# Patient Record
Sex: FEMALE | Race: WHITE | NOT HISPANIC OR LATINO | URBAN - METROPOLITAN AREA
[De-identification: names, ages, dates, MRNs, and addresses within clinical notes are randomized per-mention and may not be internally consistent; named-entity substitution may affect disease eponyms.]

---

## 2017-01-03 ENCOUNTER — OUTPATIENT (OUTPATIENT)
Dept: OUTPATIENT SERVICES | Facility: HOSPITAL | Age: 62
LOS: 1 days | End: 2017-01-03
Payer: COMMERCIAL

## 2017-01-03 DIAGNOSIS — M65.341 TRIGGER FINGER, RIGHT RING FINGER: ICD-10-CM

## 2017-01-03 DIAGNOSIS — Z01.818 ENCOUNTER FOR OTHER PREPROCEDURAL EXAMINATION: ICD-10-CM

## 2017-01-03 DIAGNOSIS — Z01.812 ENCOUNTER FOR PREPROCEDURAL LABORATORY EXAMINATION: ICD-10-CM

## 2017-01-03 DIAGNOSIS — Z90.49 ACQUIRED ABSENCE OF OTHER SPECIFIED PARTS OF DIGESTIVE TRACT: Chronic | ICD-10-CM

## 2017-01-03 DIAGNOSIS — Z01.810 ENCOUNTER FOR PREPROCEDURAL CARDIOVASCULAR EXAMINATION: ICD-10-CM

## 2017-01-03 LAB
ANION GAP SERPL CALC-SCNC: 15 MMOL/L — SIGNIFICANT CHANGE UP (ref 5–17)
BUN SERPL-MCNC: 21 MG/DL — SIGNIFICANT CHANGE UP (ref 7–23)
CALCIUM SERPL-MCNC: 9.7 MG/DL — SIGNIFICANT CHANGE UP (ref 8.4–10.5)
CHLORIDE SERPL-SCNC: 99 MMOL/L — SIGNIFICANT CHANGE UP (ref 96–108)
CO2 SERPL-SCNC: 27 MMOL/L — SIGNIFICANT CHANGE UP (ref 22–31)
CREAT SERPL-MCNC: 0.71 MG/DL — SIGNIFICANT CHANGE UP (ref 0.5–1.3)
GLUCOSE SERPL-MCNC: 86 MG/DL — SIGNIFICANT CHANGE UP (ref 70–99)
HCT VFR BLD CALC: 37.1 % — SIGNIFICANT CHANGE UP (ref 34.5–45)
HGB BLD-MCNC: 12.9 G/DL — SIGNIFICANT CHANGE UP (ref 11.5–15.5)
MCHC RBC-ENTMCNC: 33.5 PG — SIGNIFICANT CHANGE UP (ref 27–34)
MCHC RBC-ENTMCNC: 34.8 GM/DL — SIGNIFICANT CHANGE UP (ref 32–36)
MCV RBC AUTO: 96.4 FL — SIGNIFICANT CHANGE UP (ref 80–100)
PLATELET # BLD AUTO: 229 K/UL — SIGNIFICANT CHANGE UP (ref 150–400)
POTASSIUM SERPL-MCNC: 3.7 MMOL/L — SIGNIFICANT CHANGE UP (ref 3.5–5.3)
POTASSIUM SERPL-SCNC: 3.7 MMOL/L — SIGNIFICANT CHANGE UP (ref 3.5–5.3)
RBC # BLD: 3.85 M/UL — SIGNIFICANT CHANGE UP (ref 3.8–5.2)
RBC # FLD: 14.5 % — SIGNIFICANT CHANGE UP (ref 10.3–14.5)
SODIUM SERPL-SCNC: 141 MMOL/L — SIGNIFICANT CHANGE UP (ref 135–145)
WBC # BLD: 6.32 K/UL — SIGNIFICANT CHANGE UP (ref 3.8–10.5)
WBC # FLD AUTO: 6.32 K/UL — SIGNIFICANT CHANGE UP (ref 3.8–10.5)

## 2017-01-03 PROCEDURE — 80048 BASIC METABOLIC PNL TOTAL CA: CPT

## 2017-01-03 PROCEDURE — G0463: CPT

## 2017-01-03 PROCEDURE — 36415 COLL VENOUS BLD VENIPUNCTURE: CPT

## 2017-01-03 PROCEDURE — 85027 COMPLETE CBC AUTOMATED: CPT

## 2017-01-09 ENCOUNTER — APPOINTMENT (OUTPATIENT)
Dept: ORTHOPEDIC SURGERY | Facility: HOSPITAL | Age: 62
End: 2017-01-09

## 2017-01-09 ENCOUNTER — OUTPATIENT (OUTPATIENT)
Dept: OUTPATIENT SERVICES | Facility: HOSPITAL | Age: 62
LOS: 1 days | End: 2017-01-09
Payer: COMMERCIAL

## 2017-01-09 DIAGNOSIS — M65.341 TRIGGER FINGER, RIGHT RING FINGER: ICD-10-CM

## 2017-01-09 DIAGNOSIS — Z90.49 ACQUIRED ABSENCE OF OTHER SPECIFIED PARTS OF DIGESTIVE TRACT: Chronic | ICD-10-CM

## 2017-01-09 PROCEDURE — 26170 REMOVAL OF PALM TENDON EACH: CPT | Mod: RT

## 2017-01-09 PROCEDURE — 88304 TISSUE EXAM BY PATHOLOGIST: CPT | Mod: 26

## 2017-01-09 PROCEDURE — 26170 REMOVAL OF PALM TENDON EACH: CPT

## 2017-01-09 PROCEDURE — 26055 INCISE FINGER TENDON SHEATH: CPT | Mod: F8,XS

## 2017-01-09 PROCEDURE — 88304 TISSUE EXAM BY PATHOLOGIST: CPT

## 2017-01-09 PROCEDURE — 26055 INCISE FINGER TENDON SHEATH: CPT | Mod: F8

## 2017-01-12 LAB — SURGICAL PATHOLOGY STUDY: SIGNIFICANT CHANGE UP

## 2017-01-17 ENCOUNTER — APPOINTMENT (OUTPATIENT)
Dept: ORTHOPEDIC SURGERY | Facility: CLINIC | Age: 62
End: 2017-01-17

## 2017-01-17 DIAGNOSIS — M65.341 TRIGGER FINGER, RIGHT RING FINGER: ICD-10-CM

## 2017-01-17 RX ORDER — ATORVASTATIN CALCIUM 10 MG/1
10 TABLET, FILM COATED ORAL
Qty: 30 | Refills: 0 | Status: ACTIVE | COMMUNITY
Start: 2016-08-15

## 2017-01-23 ENCOUNTER — APPOINTMENT (OUTPATIENT)
Dept: ORTHOPEDIC SURGERY | Facility: CLINIC | Age: 62
End: 2017-01-23

## 2017-04-24 ENCOUNTER — RESULT REVIEW (OUTPATIENT)
Age: 62
End: 2017-04-24

## 2017-06-19 ENCOUNTER — APPOINTMENT (OUTPATIENT)
Dept: ORTHOPEDIC SURGERY | Facility: CLINIC | Age: 62
End: 2017-06-19

## 2017-08-23 ENCOUNTER — OUTPATIENT (OUTPATIENT)
Dept: OUTPATIENT SERVICES | Facility: HOSPITAL | Age: 62
LOS: 1 days | Discharge: HOME | End: 2017-08-23

## 2017-08-23 ENCOUNTER — APPOINTMENT (OUTPATIENT)
Dept: SPEECH THERAPY | Facility: CLINIC | Age: 62
End: 2017-08-23

## 2017-08-23 DIAGNOSIS — Z90.49 ACQUIRED ABSENCE OF OTHER SPECIFIED PARTS OF DIGESTIVE TRACT: Chronic | ICD-10-CM

## 2017-10-03 DIAGNOSIS — R42 DIZZINESS AND GIDDINESS: ICD-10-CM

## 2018-04-10 ENCOUNTER — OUTPATIENT (OUTPATIENT)
Dept: OUTPATIENT SERVICES | Facility: HOSPITAL | Age: 63
LOS: 1 days | Discharge: HOME | End: 2018-04-10

## 2018-04-10 DIAGNOSIS — Z90.49 ACQUIRED ABSENCE OF OTHER SPECIFIED PARTS OF DIGESTIVE TRACT: Chronic | ICD-10-CM

## 2018-04-11 ENCOUNTER — RESULT REVIEW (OUTPATIENT)
Age: 63
End: 2018-04-11

## 2018-04-11 ENCOUNTER — OUTPATIENT (OUTPATIENT)
Dept: OUTPATIENT SERVICES | Facility: HOSPITAL | Age: 63
LOS: 1 days | Discharge: STILL ADMITTED OR ADMINISTRATIVE | End: 2018-04-11
Payer: MEDICARE

## 2018-04-11 ENCOUNTER — INPATIENT (INPATIENT)
Facility: HOSPITAL | Age: 63
LOS: 19 days | Discharge: HOME | End: 2018-05-01
Attending: PSYCHIATRY & NEUROLOGY | Admitting: PSYCHIATRY & NEUROLOGY
Payer: COMMERCIAL

## 2018-04-11 VITALS — HEIGHT: 68 IN | WEIGHT: 173.94 LBS | TEMPERATURE: 98 F | RESPIRATION RATE: 18 BRPM

## 2018-04-11 DIAGNOSIS — E03.9 HYPOTHYROIDISM, UNSPECIFIED: ICD-10-CM

## 2018-04-11 DIAGNOSIS — F33.9 MAJOR DEPRESSIVE DISORDER, RECURRENT, UNSPECIFIED: ICD-10-CM

## 2018-04-11 DIAGNOSIS — F41.9 ANXIETY DISORDER, UNSPECIFIED: ICD-10-CM

## 2018-04-11 DIAGNOSIS — Z90.49 ACQUIRED ABSENCE OF OTHER SPECIFIED PARTS OF DIGESTIVE TRACT: Chronic | ICD-10-CM

## 2018-04-11 DIAGNOSIS — F33.1 MAJOR DEPRESSIVE DISORDER, RECURRENT, MODERATE: ICD-10-CM

## 2018-04-11 DIAGNOSIS — I10 ESSENTIAL (PRIMARY) HYPERTENSION: ICD-10-CM

## 2018-04-11 PROCEDURE — 90870 ELECTROCONVULSIVE THERAPY: CPT

## 2018-04-11 RX ORDER — LEVOTHYROXINE SODIUM 125 MCG
50 TABLET ORAL DAILY
Qty: 0 | Refills: 0 | Status: DISCONTINUED | OUTPATIENT
Start: 2018-04-11 | End: 2018-05-01

## 2018-04-11 RX ORDER — VALSARTAN 80 MG/1
320 TABLET ORAL DAILY
Qty: 0 | Refills: 0 | Status: DISCONTINUED | OUTPATIENT
Start: 2018-04-11 | End: 2018-05-01

## 2018-04-11 RX ORDER — ATORVASTATIN CALCIUM 80 MG/1
20 TABLET, FILM COATED ORAL AT BEDTIME
Qty: 0 | Refills: 0 | Status: DISCONTINUED | OUTPATIENT
Start: 2018-04-11 | End: 2018-05-01

## 2018-04-11 RX ORDER — HYDROCHLOROTHIAZIDE 25 MG
25 TABLET ORAL DAILY
Qty: 0 | Refills: 0 | Status: DISCONTINUED | OUTPATIENT
Start: 2018-04-11 | End: 2018-04-12

## 2018-04-11 RX ORDER — CLONAZEPAM 1 MG
0.5 TABLET ORAL
Qty: 0 | Refills: 0 | Status: DISCONTINUED | OUTPATIENT
Start: 2018-04-11 | End: 2018-04-12

## 2018-04-11 RX ADMIN — ATORVASTATIN CALCIUM 20 MILLIGRAM(S): 80 TABLET, FILM COATED ORAL at 21:05

## 2018-04-11 RX ADMIN — Medication 0.5 MILLIGRAM(S): at 21:05

## 2018-04-11 NOTE — CHART NOTE - NSCHARTNOTEFT_GEN_A_CORE
Screening Medical Evaluation  Patient Admitted from: [facility] admitting diagnosis: Recurrent major depressive disorder    PAST MEDICAL & SURGICAL HISTORY:  Anxiety  UTI (urinary tract infection)  HTN (hypertension)  Anxiety  HLD (hyperlipidemia)  HTN (hypertension)  S/P cholecystectomy: 6 yrs ago        Allergies    penicillin (Unknown)    Intolerances        Social History:     FAMILY HISTORY:  Family history of prostate cancer  Family history of diabetes mellitus  Family history of hypertension  Family history of cancer: spindle cell CA      MEDICATIONS  (STANDING):  atorvastatin 20 milliGRAM(s) Oral at bedtime  clonazePAM Tablet 0.5 milliGRAM(s) Oral two times a day  hydrochlorothiazide 25 milliGRAM(s) Oral daily  levothyroxine 50 MICROGram(s) Oral daily  valsartan 320 milliGRAM(s) Oral daily    MEDICATIONS  (PRN):  LORazepam     Tablet 1 milliGRAM(s) Oral every 6 hours PRN Anxiety  LORazepam   Injectable 2 milliGRAM(s) IntraMuscular every 6 hours PRN Agitation      Vital Signs Last 24 Hrs  T(C): 36.4 (11 Apr 2018 17:55), Max: 36.4 (11 Apr 2018 17:55)  T(F): 97.5 (11 Apr 2018 17:55), Max: 97.5 (11 Apr 2018 17:55)  HR: --  BP: --  BP(mean): --  RR: 18 (11 Apr 2018 17:55) (18 - 18)  SpO2: --  CAPILLARY BLOOD GLUCOSE            PHYSICAL EXAM:  GENERAL: NAD, well-developed  HEAD:  Atraumatic, Normocephalic  EYES: EOMI, PERRLA, conjunctiva and sclera clear  NECK: Supple, No JVD  CHEST/LUNG: Clear to auscultation bilaterally; No wheeze  HEART: Regular rate and rhythm; No murmurs, rubs, or gallops  ABDOMEN: Soft, Nontender, Nondistended; Bowel sounds present  EXTREMITIES:  2+ Peripheral Pulses, No clubbing, cyanosis, or edema  PSYCH: AAOx3  NEUROLOGY: non-focal  SKIN:     LABS:                    RADIOLOGY & ADDITIONAL TESTS:    Assessment and Plan: 62 yo F with PMH of hypertension, hyperlipidemia, and hypothyroidism is admitted to [facility] with a primary psychiatric diagnosis of Recurrent major depressive disorder. The pt currently denies having any medical complaints such as chest pain, sob, abdominal pain, n/v/d/c, or any problems with urination or bowel movements. The rest of her screening physical is unremarkable.    1.Recurrent major depressive disorder-Plan: continue with meds as per primary psychiatric team   2. HTN-Plan: continue with hydrochlorothiazide and valsartan  3. HLD-Plan: continue with atorvastatin  4. Hypothyroidism-Plan: continue with levothyroxine Screening Medical Evaluation  Patient Admitted from: [facility] admitting diagnosis: Recurrent major depressive disorder    PAST MEDICAL & SURGICAL HISTORY:  Anxiety  UTI (urinary tract infection)  HTN (hypertension)  Anxiety  HLD (hyperlipidemia)  HTN (hypertension)  S/P cholecystectomy: 6 yrs ago        Allergies    penicillin (Unknown)    Intolerances        Social History:     FAMILY HISTORY:  Family history of prostate cancer  Family history of diabetes mellitus  Family history of hypertension  Family history of cancer: spindle cell CA      MEDICATIONS  (STANDING):  atorvastatin 20 milliGRAM(s) Oral at bedtime  clonazePAM Tablet 0.5 milliGRAM(s) Oral two times a day  hydrochlorothiazide 25 milliGRAM(s) Oral daily  levothyroxine 50 MICROGram(s) Oral daily  valsartan 320 milliGRAM(s) Oral daily    MEDICATIONS  (PRN):  LORazepam     Tablet 1 milliGRAM(s) Oral every 6 hours PRN Anxiety  LORazepam   Injectable 2 milliGRAM(s) IntraMuscular every 6 hours PRN Agitation      Vital Signs Last 24 Hrs  T(C): 36.4 (11 Apr 2018 17:55), Max: 36.4 (11 Apr 2018 17:55)  T(F): 97.5 (11 Apr 2018 17:55), Max: 97.5 (11 Apr 2018 17:55)  HR: --88  BP: --136/82  BP(mean): --  RR: 18 (11 Apr 2018 17:55) (18 - 18)  SpO2: --  CAPILLARY BLOOD GLUCOSE            PHYSICAL EXAM:  GENERAL: NAD, well-developed  HEAD:  Atraumatic, Normocephalic  EYES: EOMI, PERRLA, conjunctiva and sclera clear  NECK: Supple, No JVD  CHEST/LUNG: Clear to auscultation bilaterally; No wheeze  HEART: Regular rate and rhythm; No murmurs, rubs, or gallops  ABDOMEN: Soft, Nontender, Nondistended; Bowel sounds present  EXTREMITIES:  2+ Peripheral Pulses, No clubbing, cyanosis, or edema  PSYCH: AAOx3  NEUROLOGY: non-focal  SKIN: In tact    LABS:                    RADIOLOGY & ADDITIONAL TESTS:    Assessment and Plan: 64 yo F with PMH of hypertension, hyperlipidemia, and hypothyroidism is admitted to [facility] with a primary psychiatric diagnosis of Recurrent major depressive disorder. The pt currently denies having any medical complaints such as chest pain, sob, abdominal pain, n/v/d/c, or any problems with urination or bowel movements. The rest of her screening physical is unremarkable.    1.Recurrent major depressive disorder-Plan: continue with meds as per primary psychiatric team   2. HTN-Plan: continue with hydrochlorothiazide and valsartan  3. HLD-Plan: continue with atorvastatin  4. Hypothyroidism-Plan: continue with levothyroxine

## 2018-04-12 DIAGNOSIS — F32.3 MAJOR DEPRESSIVE DISORDER, SINGLE EPISODE, SEVERE WITH PSYCHOTIC FEATURES: ICD-10-CM

## 2018-04-12 LAB
ALBUMIN SERPL ELPH-MCNC: 4 G/DL — SIGNIFICANT CHANGE UP (ref 3.3–5)
ALBUMIN SERPL ELPH-MCNC: 4 G/DL — SIGNIFICANT CHANGE UP (ref 3.3–5)
ALP SERPL-CCNC: 33 U/L — LOW (ref 40–120)
ALP SERPL-CCNC: 33 U/L — LOW (ref 40–120)
ALT FLD-CCNC: 32 U/L — SIGNIFICANT CHANGE UP (ref 4–33)
ALT FLD-CCNC: 34 U/L — HIGH (ref 4–33)
AMPHET UR-MCNC: NEGATIVE — SIGNIFICANT CHANGE UP
APPEARANCE UR: CLEAR — SIGNIFICANT CHANGE UP
AST SERPL-CCNC: 36 U/L — HIGH (ref 4–32)
AST SERPL-CCNC: 36 U/L — HIGH (ref 4–32)
BACTERIA # UR AUTO: SIGNIFICANT CHANGE UP
BARBITURATES UR SCN-MCNC: NEGATIVE — SIGNIFICANT CHANGE UP
BASOPHILS # BLD AUTO: 0.02 K/UL — SIGNIFICANT CHANGE UP (ref 0–0.2)
BASOPHILS NFR BLD AUTO: 0.3 % — SIGNIFICANT CHANGE UP (ref 0–2)
BENZODIAZ UR-MCNC: NEGATIVE — SIGNIFICANT CHANGE UP
BILIRUB SERPL-MCNC: 0.6 MG/DL — SIGNIFICANT CHANGE UP (ref 0.2–1.2)
BILIRUB SERPL-MCNC: 0.7 MG/DL — SIGNIFICANT CHANGE UP (ref 0.2–1.2)
BILIRUB UR-MCNC: NEGATIVE — SIGNIFICANT CHANGE UP
BLOOD UR QL VISUAL: HIGH
BUN SERPL-MCNC: 10 MG/DL — SIGNIFICANT CHANGE UP (ref 7–23)
BUN SERPL-MCNC: 11 MG/DL — SIGNIFICANT CHANGE UP (ref 7–23)
CALCIUM SERPL-MCNC: 9.3 MG/DL — SIGNIFICANT CHANGE UP (ref 8.4–10.5)
CALCIUM SERPL-MCNC: 9.3 MG/DL — SIGNIFICANT CHANGE UP (ref 8.4–10.5)
CANNABINOIDS UR-MCNC: NEGATIVE — SIGNIFICANT CHANGE UP
CHLORIDE SERPL-SCNC: 82 MMOL/L — LOW (ref 98–107)
CHLORIDE SERPL-SCNC: 83 MMOL/L — LOW (ref 98–107)
CHOLEST SERPL-MCNC: 137 MG/DL — SIGNIFICANT CHANGE UP (ref 120–199)
CO2 SERPL-SCNC: 30 MMOL/L — SIGNIFICANT CHANGE UP (ref 22–31)
CO2 SERPL-SCNC: 31 MMOL/L — SIGNIFICANT CHANGE UP (ref 22–31)
COCAINE METAB.OTHER UR-MCNC: NEGATIVE — SIGNIFICANT CHANGE UP
COLOR SPEC: SIGNIFICANT CHANGE UP
CREAT SERPL-MCNC: 0.62 MG/DL — SIGNIFICANT CHANGE UP (ref 0.5–1.3)
CREAT SERPL-MCNC: 0.63 MG/DL — SIGNIFICANT CHANGE UP (ref 0.5–1.3)
EOSINOPHIL # BLD AUTO: 0.1 K/UL — SIGNIFICANT CHANGE UP (ref 0–0.5)
EOSINOPHIL NFR BLD AUTO: 1.4 % — SIGNIFICANT CHANGE UP (ref 0–6)
GLUCOSE SERPL-MCNC: 110 MG/DL — HIGH (ref 70–99)
GLUCOSE SERPL-MCNC: 95 MG/DL — SIGNIFICANT CHANGE UP (ref 70–99)
GLUCOSE UR-MCNC: NEGATIVE — SIGNIFICANT CHANGE UP
HBA1C BLD-MCNC: 5.4 % — SIGNIFICANT CHANGE UP (ref 4–5.6)
HCT VFR BLD CALC: 36.7 % — SIGNIFICANT CHANGE UP (ref 34.5–45)
HDLC SERPL-MCNC: 72 MG/DL — HIGH (ref 45–65)
HGB BLD-MCNC: 12.9 G/DL — SIGNIFICANT CHANGE UP (ref 11.5–15.5)
IMM GRANULOCYTES # BLD AUTO: 0.02 # — SIGNIFICANT CHANGE UP
IMM GRANULOCYTES NFR BLD AUTO: 0.3 % — SIGNIFICANT CHANGE UP (ref 0–1.5)
KETONES UR-MCNC: NEGATIVE — SIGNIFICANT CHANGE UP
LEUKOCYTE ESTERASE UR-ACNC: HIGH
LIPID PNL WITH DIRECT LDL SERPL: 60 MG/DL — SIGNIFICANT CHANGE UP
LYMPHOCYTES # BLD AUTO: 0.93 K/UL — LOW (ref 1–3.3)
LYMPHOCYTES # BLD AUTO: 12.7 % — LOW (ref 13–44)
MCHC RBC-ENTMCNC: 33.9 PG — SIGNIFICANT CHANGE UP (ref 27–34)
MCHC RBC-ENTMCNC: 35.1 % — SIGNIFICANT CHANGE UP (ref 32–36)
MCV RBC AUTO: 96.6 FL — SIGNIFICANT CHANGE UP (ref 80–100)
METHADONE UR-MCNC: NEGATIVE — SIGNIFICANT CHANGE UP
MONOCYTES # BLD AUTO: 0.86 K/UL — SIGNIFICANT CHANGE UP (ref 0–0.9)
MONOCYTES NFR BLD AUTO: 11.8 % — SIGNIFICANT CHANGE UP (ref 2–14)
MUCOUS THREADS # UR AUTO: SIGNIFICANT CHANGE UP
NEUTROPHILS # BLD AUTO: 5.37 K/UL — SIGNIFICANT CHANGE UP (ref 1.8–7.4)
NEUTROPHILS NFR BLD AUTO: 73.5 % — SIGNIFICANT CHANGE UP (ref 43–77)
NITRITE UR-MCNC: NEGATIVE — SIGNIFICANT CHANGE UP
NRBC # FLD: 0 — SIGNIFICANT CHANGE UP
OPIATES UR-MCNC: NEGATIVE — SIGNIFICANT CHANGE UP
OSMOLALITY SERPL: 250 MOSMO/KG — LOW (ref 275–295)
OXYCODONE UR-MCNC: NEGATIVE — SIGNIFICANT CHANGE UP
PCP UR-MCNC: NEGATIVE — SIGNIFICANT CHANGE UP
PH UR: 7.5 — SIGNIFICANT CHANGE UP (ref 4.6–8)
PLATELET # BLD AUTO: 241 K/UL — SIGNIFICANT CHANGE UP (ref 150–400)
PMV BLD: 11 FL — SIGNIFICANT CHANGE UP (ref 7–13)
POTASSIUM SERPL-MCNC: 3.2 MMOL/L — LOW (ref 3.5–5.3)
POTASSIUM SERPL-MCNC: 3.9 MMOL/L — SIGNIFICANT CHANGE UP (ref 3.5–5.3)
POTASSIUM SERPL-SCNC: 3.2 MMOL/L — LOW (ref 3.5–5.3)
POTASSIUM SERPL-SCNC: 3.9 MMOL/L — SIGNIFICANT CHANGE UP (ref 3.5–5.3)
PROT SERPL-MCNC: 7.1 G/DL — SIGNIFICANT CHANGE UP (ref 6–8.3)
PROT SERPL-MCNC: 7.2 G/DL — SIGNIFICANT CHANGE UP (ref 6–8.3)
PROT UR-MCNC: NEGATIVE MG/DL — SIGNIFICANT CHANGE UP
RBC # BLD: 3.8 M/UL — SIGNIFICANT CHANGE UP (ref 3.8–5.2)
RBC # FLD: 14.2 % — SIGNIFICANT CHANGE UP (ref 10.3–14.5)
RBC CASTS # UR COMP ASSIST: SIGNIFICANT CHANGE UP (ref 0–?)
SODIUM SERPL-SCNC: 123 MMOL/L — LOW (ref 135–145)
SODIUM SERPL-SCNC: 126 MMOL/L — LOW (ref 135–145)
SP GR SPEC: 1.01 — SIGNIFICANT CHANGE UP (ref 1–1.04)
SQUAMOUS # UR AUTO: SIGNIFICANT CHANGE UP
TRIGL SERPL-MCNC: 62 MG/DL — SIGNIFICANT CHANGE UP (ref 10–149)
TSH SERPL-MCNC: 0.89 UIU/ML — SIGNIFICANT CHANGE UP (ref 0.27–4.2)
UROBILINOGEN FLD QL: NORMAL MG/DL — SIGNIFICANT CHANGE UP
WBC # BLD: 7.3 K/UL — SIGNIFICANT CHANGE UP (ref 3.8–10.5)
WBC # FLD AUTO: 7.3 K/UL — SIGNIFICANT CHANGE UP (ref 3.8–10.5)
WBC UR QL: SIGNIFICANT CHANGE UP (ref 0–?)

## 2018-04-12 PROCEDURE — 90853 GROUP PSYCHOTHERAPY: CPT

## 2018-04-12 PROCEDURE — 99223 1ST HOSP IP/OBS HIGH 75: CPT

## 2018-04-12 PROCEDURE — 99223 1ST HOSP IP/OBS HIGH 75: CPT | Mod: 25

## 2018-04-12 PROCEDURE — 93010 ELECTROCARDIOGRAM REPORT: CPT

## 2018-04-12 RX ORDER — SODIUM CHLORIDE 9 MG/ML
1 INJECTION INTRAMUSCULAR; INTRAVENOUS; SUBCUTANEOUS
Qty: 0 | Refills: 0 | Status: DISCONTINUED | OUTPATIENT
Start: 2018-04-12 | End: 2018-04-13

## 2018-04-12 RX ORDER — LANOLIN ALCOHOL/MO/W.PET/CERES
3 CREAM (GRAM) TOPICAL AT BEDTIME
Qty: 0 | Refills: 0 | Status: DISCONTINUED | OUTPATIENT
Start: 2018-04-12 | End: 2018-04-17

## 2018-04-12 RX ADMIN — Medication 3 MILLIGRAM(S): at 21:06

## 2018-04-12 RX ADMIN — VALSARTAN 320 MILLIGRAM(S): 80 TABLET ORAL at 08:45

## 2018-04-12 RX ADMIN — Medication 0.5 MILLIGRAM(S): at 08:45

## 2018-04-12 RX ADMIN — SODIUM CHLORIDE 1 GRAM(S): 9 INJECTION INTRAMUSCULAR; INTRAVENOUS; SUBCUTANEOUS at 21:07

## 2018-04-12 RX ADMIN — SODIUM CHLORIDE 1 GRAM(S): 9 INJECTION INTRAMUSCULAR; INTRAVENOUS; SUBCUTANEOUS at 13:44

## 2018-04-12 RX ADMIN — ATORVASTATIN CALCIUM 20 MILLIGRAM(S): 80 TABLET, FILM COATED ORAL at 21:06

## 2018-04-12 RX ADMIN — Medication 25 MILLIGRAM(S): at 08:45

## 2018-04-12 RX ADMIN — Medication 50 MICROGRAM(S): at 08:45

## 2018-04-12 NOTE — CONSULT NOTE ADULT - SUBJECTIVE AND OBJECTIVE BOX
HPI:          PAST MEDICAL & SURGICAL HISTORY:  Anxiety  UTI (urinary tract infection)  HTN (hypertension)  Anxiety  HLD (hyperlipidemia)  HTN (hypertension)  S/P cholecystectomy: 6 yrs ago      Review of Systems:   CONSTITUTIONAL: No fever, weight loss, or fatigue  EYES: No eye pain, visual disturbances, or discharge  ENMT:  No difficulty hearing, tinnitus, vertigo; No sinus or throat pain  NECK: No pain or stiffness  RESPIRATORY: No cough, wheezing, chills or hemoptysis; No shortness of breath  CARDIOVASCULAR: No chest pain, palpitations, dizziness, or leg swelling  GASTROINTESTINAL: No abdominal or epigastric pain. No nausea, vomiting, or hematemesis; No diarrhea or constipation. No melena or hematochezia.  GENITOURINARY: No dysuria, frequency, hematuria, or incontinence  NEUROLOGICAL: No headaches, memory loss, loss of strength, numbness, or tremors  SKIN: No itching, burning, rashes, or lesions   ENDOCRINE: No heat or cold intolerance; No hair loss  MUSCULOSKELETAL: No joint pain or swelling; No muscle, back, or extremity pain      Allergies    penicillin (Unknown)    Intolerances        Social History:   lives w/   denies substance abuse     FAMILY HISTORY:  Family history of prostate cancer  Family history of diabetes mellitus  Family history of hypertension  Family history of cancer: spindle cell CA      MEDICATIONS  (STANDING):  atorvastatin 20 milliGRAM(s) Oral at bedtime  clonazePAM Tablet 0.5 milliGRAM(s) Oral two times a day  levothyroxine 50 MICROGram(s) Oral daily  sodium chloride 1 Gram(s) Oral two times a day  valsartan 320 milliGRAM(s) Oral daily    MEDICATIONS  (PRN):  LORazepam     Tablet 1 milliGRAM(s) Oral every 6 hours PRN Anxiety  LORazepam   Injectable 2 milliGRAM(s) IntraMuscular every 6 hours PRN Agitation        CAPILLARY BLOOD GLUCOSE      VS:  T 98.0 /83 HR 79 RR 20       PHYSICAL EXAM:  GENERAL: NAD, well-developed  HEAD:  Atraumatic, Normocephalic  EYES: EOMI, PERRLA, conjunctiva and sclera clear  NECK: Supple, No JVD  CHEST/LUNG: Clear to auscultation bilaterally; No wheeze  HEART: Regular rate and rhythm; No murmurs, rubs, or gallops  ABDOMEN: Soft, Nontender, Nondistended; Bowel sounds present  EXTREMITIES:  2+ Peripheral Pulses, No clubbing, cyanosis, or edema  NEUROLOGY: non-focal  SKIN: No rashes or lesions    LABS:                        12.9   7.30  )-----------( 241      ( 2018 08:11 )             36.7     04-12    123<L>  |  82<L>  |  11  ----------------------------<  95  3.9   |  31  |  0.62    Ca    9.3      2018 12:36    TPro  7.1  /  Alb  4.0  /  TBili  0.6  /  DBili  x   /  AST  36<H>  /  ALT  32  /  AlkPhos  33<L>  04-12          Urinalysis Basic - ( 2018 08:03 )    Color: COLORL / Appearance: CLEAR / S.006 / pH: 7.5  Gluc: NEGATIVE / Ketone: NEGATIVE  / Bili: NEGATIVE / Urobili: NORMAL mg/dL   Blood: SMALL / Protein: NEGATIVE mg/dL / Nitrite: NEGATIVE   Leuk Esterase: LARGE / RBC: 2-5 / WBC 10-25   Sq Epi: OCC / Non Sq Epi: x / Bacteria: FEW        RADIOLOGY & ADDITIONAL TESTS:    Imaging Personally Reviewed:    Consultant(s) Notes Reviewed:      Care Discussed with Consultants/Other Providers: Dr. Khalil HPI:    62 yo F w/ MDD, hypothyroidism, HTN, HLD admitted for psychiatric stabilization, with plan for ECT. Pt states she last had ECT in  with good effect.   She denies any previous history of hyponatremia, and states that she saw her PMD Dr. Butcher about a week ago and had routine bloodwork done, "cannot remember" if she was told there was any abnormality.     Pt denies all medical complaints at this time, is eager to start ECT again.         PAST MEDICAL & SURGICAL HISTORY:  Anxiety  UTI (urinary tract infection)  HTN (hypertension)  Anxiety  HLD (hyperlipidemia)  HTN (hypertension)  S/P cholecystectomy: 6 yrs ago      Review of Systems:   CONSTITUTIONAL: No fever, weight loss, or fatigue  EYES: No eye pain, visual disturbances, or discharge  ENMT:  No difficulty hearing, tinnitus, vertigo; No sinus or throat pain  NECK: No pain or stiffness  RESPIRATORY: No cough, wheezing, chills or hemoptysis; No shortness of breath  CARDIOVASCULAR: No chest pain, palpitations, dizziness, or leg swelling  GASTROINTESTINAL: No abdominal or epigastric pain. No nausea, vomiting, or hematemesis; No diarrhea or constipation. No melena or hematochezia.  GENITOURINARY: No dysuria, frequency, hematuria, or incontinence  NEUROLOGICAL: No headaches, memory loss, loss of strength, numbness, or tremors  SKIN: No itching, burning, rashes, or lesions   ENDOCRINE: No heat or cold intolerance; No hair loss  MUSCULOSKELETAL: No joint pain or swelling; No muscle, back, or extremity pain      Allergies    penicillin (Unknown)    Intolerances        Social History:   lives w/   denies substance abuse     FAMILY HISTORY:  Family history of prostate cancer  Family history of diabetes mellitus  Family history of hypertension  Family history of cancer: spindle cell CA      MEDICATIONS  (STANDING):  atorvastatin 20 milliGRAM(s) Oral at bedtime  clonazePAM Tablet 0.5 milliGRAM(s) Oral two times a day  levothyroxine 50 MICROGram(s) Oral daily  sodium chloride 1 Gram(s) Oral two times a day  valsartan 320 milliGRAM(s) Oral daily    MEDICATIONS  (PRN):  LORazepam     Tablet 1 milliGRAM(s) Oral every 6 hours PRN Anxiety  LORazepam   Injectable 2 milliGRAM(s) IntraMuscular every 6 hours PRN Agitation        CAPILLARY BLOOD GLUCOSE      VS:  T 98.0 /83 HR 79 RR 20       PHYSICAL EXAM:  GENERAL: NAD, well-developed  HEAD:  Atraumatic, Normocephalic  EYES: EOMI, PERRLA, conjunctiva and sclera clear  NECK: Supple, No JVD  CHEST/LUNG: Clear to auscultation bilaterally; No wheeze  HEART: Regular rate and rhythm; No murmurs, rubs, or gallops  ABDOMEN: Soft, Nontender, Nondistended; Bowel sounds present  EXTREMITIES:  2+ Peripheral Pulses, No clubbing, cyanosis, or edema  NEUROLOGY: non-focal  SKIN: No rashes or lesions    LABS:                        12.9   7.30  )-----------( 241      ( 2018 08:11 )             36.7     04-12    123<L>  |  82<L>  |  11  ----------------------------<  95  3.9   |  31  |  0.62    Ca    9.3      2018 12:36    TPro  7.1  /  Alb  4.0  /  TBili  0.6  /  DBili  x   /  AST  36<H>  /  ALT  32  /  AlkPhos  33<L>  04-12          Urinalysis Basic - ( 2018 08:03 )    Color: COLORL / Appearance: CLEAR / S.006 / pH: 7.5  Gluc: NEGATIVE / Ketone: NEGATIVE  / Bili: NEGATIVE / Urobili: NORMAL mg/dL   Blood: SMALL / Protein: NEGATIVE mg/dL / Nitrite: NEGATIVE   Leuk Esterase: LARGE / RBC: 2-5 / WBC 10-25   Sq Epi: OCC / Non Sq Epi: x / Bacteria: FEW        RADIOLOGY & ADDITIONAL TESTS:    Imaging Personally Reviewed:    Consultant(s) Notes Reviewed:      Care Discussed with Consultants/Other Providers: Dr. Khalil

## 2018-04-12 NOTE — CONSULT NOTE ADULT - ASSESSMENT
62 yo F w/ MDD, hypothyroidism, HTN, HLD admitted for psychiatric stabilization, found to have hyponatremia 64 yo F w/ MDD, hypothyroidism, HTN, HLD admitted for psychiatric stabilization, found to have hyponatremia     # Hyponatremia - pt euvolemic on exam, would d/c thiazide (HCTZ), encourage PO hydration, repeat BMP in AM. D/w Dr. Khalil - unless pt's Na>130, pt is not medically optimized for ECT. If sodium does not improve, may need transfer to [facility] ED for further w/u. Will check serum osmolarity, urine osmolarity, urine sodium. Avoid any psychotropics that may exacerbate hyponatremia    # Hypothyroidism - c/w Synthroid, TSH wnl    #HTN - c/w valsartan, holding HCTZ

## 2018-04-13 LAB
ALBUMIN SERPL ELPH-MCNC: 4.2 G/DL — SIGNIFICANT CHANGE UP (ref 3.3–5)
ALP SERPL-CCNC: 31 U/L — LOW (ref 40–120)
ALT FLD-CCNC: 31 U/L — SIGNIFICANT CHANGE UP (ref 4–33)
AST SERPL-CCNC: 30 U/L — SIGNIFICANT CHANGE UP (ref 4–32)
BILIRUB SERPL-MCNC: 0.5 MG/DL — SIGNIFICANT CHANGE UP (ref 0.2–1.2)
BUN SERPL-MCNC: 8 MG/DL — SIGNIFICANT CHANGE UP (ref 7–23)
CALCIUM SERPL-MCNC: 9 MG/DL — SIGNIFICANT CHANGE UP (ref 8.4–10.5)
CHLORIDE SERPL-SCNC: 90 MMOL/L — LOW (ref 98–107)
CO2 SERPL-SCNC: 30 MMOL/L — SIGNIFICANT CHANGE UP (ref 22–31)
CREAT SERPL-MCNC: 0.64 MG/DL — SIGNIFICANT CHANGE UP (ref 0.5–1.3)
GLUCOSE SERPL-MCNC: 89 MG/DL — SIGNIFICANT CHANGE UP (ref 70–99)
OSMOLALITY SERPL: 270 MOSMO/KG — LOW (ref 275–295)
POTASSIUM SERPL-MCNC: 3.8 MMOL/L — SIGNIFICANT CHANGE UP (ref 3.5–5.3)
POTASSIUM SERPL-SCNC: 3.8 MMOL/L — SIGNIFICANT CHANGE UP (ref 3.5–5.3)
PROT SERPL-MCNC: 7 G/DL — SIGNIFICANT CHANGE UP (ref 6–8.3)
SODIUM SERPL-SCNC: 133 MMOL/L — LOW (ref 135–145)

## 2018-04-13 PROCEDURE — 99232 SBSQ HOSP IP/OBS MODERATE 35: CPT | Mod: 25

## 2018-04-13 PROCEDURE — 90870 ELECTROCONVULSIVE THERAPY: CPT

## 2018-04-13 RX ORDER — IBUPROFEN 200 MG
600 TABLET ORAL ONCE
Qty: 0 | Refills: 0 | Status: COMPLETED | OUTPATIENT
Start: 2018-04-13 | End: 2018-04-13

## 2018-04-13 RX ADMIN — Medication 1 MILLIGRAM(S): at 23:00

## 2018-04-13 RX ADMIN — SODIUM CHLORIDE 1 GRAM(S): 9 INJECTION INTRAMUSCULAR; INTRAVENOUS; SUBCUTANEOUS at 09:45

## 2018-04-13 RX ADMIN — Medication 3 MILLIGRAM(S): at 22:13

## 2018-04-13 RX ADMIN — VALSARTAN 320 MILLIGRAM(S): 80 TABLET ORAL at 09:45

## 2018-04-13 RX ADMIN — Medication 600 MILLIGRAM(S): at 16:29

## 2018-04-13 RX ADMIN — ATORVASTATIN CALCIUM 20 MILLIGRAM(S): 80 TABLET, FILM COATED ORAL at 22:13

## 2018-04-13 RX ADMIN — Medication 50 MICROGRAM(S): at 09:45

## 2018-04-13 NOTE — CHART NOTE - NSCHARTNOTEFT_GEN_A_CORE
Pt Na 133 this morning.  Pt is medically optimized for ECT at this time.  Will recheck BMP on Monday.  D/w Dr. Khalil      Depression with need for ECT, pre-procedure evaluation:   ECT specific risk assessment: pt without history of increased ICP, aneurysm, active pulmonary disease, valvular disease, CAD, cerebral vascular disease.     Cardiovascular risk assessment: Patient evaluated using the revised cardiac risk index and is felt to be low cardiovascular risk for a low cardiovascular risk procedure based on these criteria.   Risk for complication is low. Patient is optimized for ECT treatment without further intervention and can proceed with treatment.    Anesthesia specific concerns : None    History of adverse reaction to anesthesia previously: No   Esophageal Assessment: wnl   Known Spine issues: no   CKD: No

## 2018-04-14 PROCEDURE — 99232 SBSQ HOSP IP/OBS MODERATE 35: CPT

## 2018-04-14 RX ORDER — DOCUSATE SODIUM 100 MG
100 CAPSULE ORAL DAILY
Qty: 0 | Refills: 0 | Status: DISCONTINUED | OUTPATIENT
Start: 2018-04-14 | End: 2018-04-19

## 2018-04-14 RX ORDER — SENNA PLUS 8.6 MG/1
1 TABLET ORAL DAILY
Qty: 0 | Refills: 0 | Status: DISCONTINUED | OUTPATIENT
Start: 2018-04-14 | End: 2018-04-19

## 2018-04-14 RX ADMIN — Medication 3 MILLIGRAM(S): at 21:10

## 2018-04-14 RX ADMIN — SENNA PLUS 1 TABLET(S): 8.6 TABLET ORAL at 21:50

## 2018-04-14 RX ADMIN — Medication 50 MICROGRAM(S): at 10:36

## 2018-04-14 RX ADMIN — Medication 100 MILLIGRAM(S): at 14:32

## 2018-04-14 RX ADMIN — Medication 1 MILLIGRAM(S): at 10:57

## 2018-04-14 RX ADMIN — Medication 1 MILLIGRAM(S): at 21:50

## 2018-04-14 RX ADMIN — VALSARTAN 320 MILLIGRAM(S): 80 TABLET ORAL at 10:36

## 2018-04-14 RX ADMIN — ATORVASTATIN CALCIUM 20 MILLIGRAM(S): 80 TABLET, FILM COATED ORAL at 21:10

## 2018-04-15 LAB
APPEARANCE UR: CLEAR — SIGNIFICANT CHANGE UP
BILIRUB UR-MCNC: NEGATIVE — SIGNIFICANT CHANGE UP
BLOOD UR QL VISUAL: HIGH
COLOR SPEC: SIGNIFICANT CHANGE UP
GLUCOSE UR-MCNC: NEGATIVE — SIGNIFICANT CHANGE UP
HCG UR-SCNC: NEGATIVE — SIGNIFICANT CHANGE UP
KETONES UR-MCNC: NEGATIVE — SIGNIFICANT CHANGE UP
LEUKOCYTE ESTERASE UR-ACNC: NEGATIVE — SIGNIFICANT CHANGE UP
NITRITE UR-MCNC: NEGATIVE — SIGNIFICANT CHANGE UP
PH UR: 7 — SIGNIFICANT CHANGE UP (ref 4.6–8)
PROT UR-MCNC: 30 MG/DL — HIGH
RBC CASTS # UR COMP ASSIST: SIGNIFICANT CHANGE UP (ref 0–?)
SP GR SPEC: 1 — LOW (ref 1–1.04)
SP GR UR: 1 — SIGNIFICANT CHANGE UP (ref 1–1.03)
UROBILINOGEN FLD QL: NORMAL MG/DL — SIGNIFICANT CHANGE UP
WBC UR QL: SIGNIFICANT CHANGE UP (ref 0–?)

## 2018-04-15 PROCEDURE — 99232 SBSQ HOSP IP/OBS MODERATE 35: CPT

## 2018-04-15 RX ADMIN — VALSARTAN 320 MILLIGRAM(S): 80 TABLET ORAL at 08:41

## 2018-04-15 RX ADMIN — ATORVASTATIN CALCIUM 20 MILLIGRAM(S): 80 TABLET, FILM COATED ORAL at 21:53

## 2018-04-15 RX ADMIN — Medication 50 MICROGRAM(S): at 08:41

## 2018-04-15 RX ADMIN — Medication 3 MILLIGRAM(S): at 21:53

## 2018-04-15 RX ADMIN — Medication 1 MILLIGRAM(S): at 21:53

## 2018-04-16 LAB
ALBUMIN SERPL ELPH-MCNC: 4.3 G/DL — SIGNIFICANT CHANGE UP (ref 3.3–5)
ALP SERPL-CCNC: 31 U/L — LOW (ref 40–120)
ALT FLD-CCNC: 36 U/L — HIGH (ref 4–33)
AST SERPL-CCNC: 32 U/L — SIGNIFICANT CHANGE UP (ref 4–32)
BILIRUB SERPL-MCNC: 0.6 MG/DL — SIGNIFICANT CHANGE UP (ref 0.2–1.2)
BUN SERPL-MCNC: 7 MG/DL — SIGNIFICANT CHANGE UP (ref 7–23)
BUN SERPL-MCNC: 7 MG/DL — SIGNIFICANT CHANGE UP (ref 7–23)
CALCIUM SERPL-MCNC: 9.2 MG/DL — SIGNIFICANT CHANGE UP (ref 8.4–10.5)
CALCIUM SERPL-MCNC: 9.2 MG/DL — SIGNIFICANT CHANGE UP (ref 8.4–10.5)
CHLORIDE SERPL-SCNC: 92 MMOL/L — LOW (ref 98–107)
CHLORIDE SERPL-SCNC: 92 MMOL/L — LOW (ref 98–107)
CO2 SERPL-SCNC: 30 MMOL/L — SIGNIFICANT CHANGE UP (ref 22–31)
CO2 SERPL-SCNC: 30 MMOL/L — SIGNIFICANT CHANGE UP (ref 22–31)
CREAT SERPL-MCNC: 0.61 MG/DL — SIGNIFICANT CHANGE UP (ref 0.5–1.3)
CREAT SERPL-MCNC: 0.61 MG/DL — SIGNIFICANT CHANGE UP (ref 0.5–1.3)
GLUCOSE SERPL-MCNC: 112 MG/DL — HIGH (ref 70–99)
GLUCOSE SERPL-MCNC: 112 MG/DL — HIGH (ref 70–99)
POTASSIUM SERPL-MCNC: 3.7 MMOL/L — SIGNIFICANT CHANGE UP (ref 3.5–5.3)
POTASSIUM SERPL-MCNC: 3.7 MMOL/L — SIGNIFICANT CHANGE UP (ref 3.5–5.3)
POTASSIUM SERPL-SCNC: 3.7 MMOL/L — SIGNIFICANT CHANGE UP (ref 3.5–5.3)
POTASSIUM SERPL-SCNC: 3.7 MMOL/L — SIGNIFICANT CHANGE UP (ref 3.5–5.3)
PROT SERPL-MCNC: 7.5 G/DL — SIGNIFICANT CHANGE UP (ref 6–8.3)
SODIUM SERPL-SCNC: 134 MMOL/L — LOW (ref 135–145)
SODIUM SERPL-SCNC: 134 MMOL/L — LOW (ref 135–145)

## 2018-04-16 PROCEDURE — 99232 SBSQ HOSP IP/OBS MODERATE 35: CPT | Mod: 25

## 2018-04-16 PROCEDURE — 90870 ELECTROCONVULSIVE THERAPY: CPT

## 2018-04-16 RX ORDER — IBUPROFEN 200 MG
600 TABLET ORAL EVERY 6 HOURS
Qty: 0 | Refills: 0 | Status: DISCONTINUED | OUTPATIENT
Start: 2018-04-16 | End: 2018-04-16

## 2018-04-16 RX ORDER — IBUPROFEN 200 MG
600 TABLET ORAL EVERY 6 HOURS
Qty: 0 | Refills: 0 | Status: DISCONTINUED | OUTPATIENT
Start: 2018-04-16 | End: 2018-05-01

## 2018-04-16 RX ADMIN — Medication 50 MICROGRAM(S): at 08:46

## 2018-04-16 RX ADMIN — ATORVASTATIN CALCIUM 20 MILLIGRAM(S): 80 TABLET, FILM COATED ORAL at 20:49

## 2018-04-16 RX ADMIN — VALSARTAN 320 MILLIGRAM(S): 80 TABLET ORAL at 08:46

## 2018-04-16 RX ADMIN — Medication 3 MILLIGRAM(S): at 20:49

## 2018-04-16 RX ADMIN — Medication 600 MILLIGRAM(S): at 15:05

## 2018-04-17 LAB
BACTERIA UR CULT: SIGNIFICANT CHANGE UP
SPECIMEN SOURCE: SIGNIFICANT CHANGE UP

## 2018-04-17 PROCEDURE — 99232 SBSQ HOSP IP/OBS MODERATE 35: CPT | Mod: 25

## 2018-04-17 PROCEDURE — 90853 GROUP PSYCHOTHERAPY: CPT

## 2018-04-17 PROCEDURE — 90832 PSYTX W PT 30 MINUTES: CPT | Mod: 59

## 2018-04-17 RX ORDER — LANOLIN ALCOHOL/MO/W.PET/CERES
5 CREAM (GRAM) TOPICAL AT BEDTIME
Qty: 0 | Refills: 0 | Status: DISCONTINUED | OUTPATIENT
Start: 2018-04-17 | End: 2018-04-18

## 2018-04-17 RX ORDER — MIRTAZAPINE 45 MG/1
7.5 TABLET, ORALLY DISINTEGRATING ORAL AT BEDTIME
Qty: 0 | Refills: 0 | Status: DISCONTINUED | OUTPATIENT
Start: 2018-04-17 | End: 2018-04-18

## 2018-04-17 RX ADMIN — ATORVASTATIN CALCIUM 20 MILLIGRAM(S): 80 TABLET, FILM COATED ORAL at 22:15

## 2018-04-17 RX ADMIN — VALSARTAN 320 MILLIGRAM(S): 80 TABLET ORAL at 09:47

## 2018-04-17 RX ADMIN — MIRTAZAPINE 7.5 MILLIGRAM(S): 45 TABLET, ORALLY DISINTEGRATING ORAL at 22:15

## 2018-04-17 RX ADMIN — Medication 50 MICROGRAM(S): at 09:47

## 2018-04-17 RX ADMIN — Medication 5 MILLIGRAM(S): at 22:15

## 2018-04-17 RX ADMIN — Medication 1 MILLIGRAM(S): at 00:00

## 2018-04-18 PROCEDURE — 99232 SBSQ HOSP IP/OBS MODERATE 35: CPT | Mod: 25

## 2018-04-18 PROCEDURE — 90870 ELECTROCONVULSIVE THERAPY: CPT

## 2018-04-18 RX ORDER — QUETIAPINE FUMARATE 200 MG/1
50 TABLET, FILM COATED ORAL AT BEDTIME
Qty: 0 | Refills: 0 | Status: DISCONTINUED | OUTPATIENT
Start: 2018-04-18 | End: 2018-05-01

## 2018-04-18 RX ORDER — MIRTAZAPINE 45 MG/1
15 TABLET, ORALLY DISINTEGRATING ORAL AT BEDTIME
Qty: 0 | Refills: 0 | Status: DISCONTINUED | OUTPATIENT
Start: 2018-04-18 | End: 2018-05-01

## 2018-04-18 RX ADMIN — ATORVASTATIN CALCIUM 20 MILLIGRAM(S): 80 TABLET, FILM COATED ORAL at 21:06

## 2018-04-18 RX ADMIN — QUETIAPINE FUMARATE 50 MILLIGRAM(S): 200 TABLET, FILM COATED ORAL at 21:06

## 2018-04-18 RX ADMIN — VALSARTAN 320 MILLIGRAM(S): 80 TABLET ORAL at 09:44

## 2018-04-18 RX ADMIN — Medication 50 MICROGRAM(S): at 09:44

## 2018-04-19 PROCEDURE — 99232 SBSQ HOSP IP/OBS MODERATE 35: CPT

## 2018-04-19 RX ORDER — DOCUSATE SODIUM 100 MG
100 CAPSULE ORAL
Qty: 0 | Refills: 0 | Status: DISCONTINUED | OUTPATIENT
Start: 2018-04-19 | End: 2018-05-01

## 2018-04-19 RX ORDER — SENNA PLUS 8.6 MG/1
2 TABLET ORAL AT BEDTIME
Qty: 0 | Refills: 0 | Status: DISCONTINUED | OUTPATIENT
Start: 2018-04-19 | End: 2018-05-01

## 2018-04-19 RX ADMIN — Medication 50 MICROGRAM(S): at 09:04

## 2018-04-19 RX ADMIN — VALSARTAN 320 MILLIGRAM(S): 80 TABLET ORAL at 09:04

## 2018-04-19 RX ADMIN — QUETIAPINE FUMARATE 50 MILLIGRAM(S): 200 TABLET, FILM COATED ORAL at 20:59

## 2018-04-19 RX ADMIN — MIRTAZAPINE 15 MILLIGRAM(S): 45 TABLET, ORALLY DISINTEGRATING ORAL at 21:00

## 2018-04-19 RX ADMIN — ATORVASTATIN CALCIUM 20 MILLIGRAM(S): 80 TABLET, FILM COATED ORAL at 20:59

## 2018-04-19 RX ADMIN — Medication 100 MILLIGRAM(S): at 20:59

## 2018-04-19 RX ADMIN — SENNA PLUS 2 TABLET(S): 8.6 TABLET ORAL at 20:59

## 2018-04-20 PROCEDURE — 90870 ELECTROCONVULSIVE THERAPY: CPT

## 2018-04-20 PROCEDURE — 99232 SBSQ HOSP IP/OBS MODERATE 35: CPT | Mod: 25

## 2018-04-20 PROCEDURE — 99232 SBSQ HOSP IP/OBS MODERATE 35: CPT

## 2018-04-20 RX ADMIN — QUETIAPINE FUMARATE 50 MILLIGRAM(S): 200 TABLET, FILM COATED ORAL at 21:04

## 2018-04-20 RX ADMIN — ATORVASTATIN CALCIUM 20 MILLIGRAM(S): 80 TABLET, FILM COATED ORAL at 21:04

## 2018-04-20 RX ADMIN — Medication 50 MICROGRAM(S): at 13:30

## 2018-04-20 RX ADMIN — Medication 100 MILLIGRAM(S): at 21:04

## 2018-04-20 RX ADMIN — SENNA PLUS 2 TABLET(S): 8.6 TABLET ORAL at 21:05

## 2018-04-20 RX ADMIN — Medication 100 MILLIGRAM(S): at 13:30

## 2018-04-20 RX ADMIN — MIRTAZAPINE 15 MILLIGRAM(S): 45 TABLET, ORALLY DISINTEGRATING ORAL at 21:04

## 2018-04-20 RX ADMIN — VALSARTAN 320 MILLIGRAM(S): 80 TABLET ORAL at 13:30

## 2018-04-20 NOTE — PROGRESS NOTE ADULT - ASSESSMENT
64 yo F w/ MDD, hypothyroidism, HTN, HLD admitted for psychiatric stabilization currently not responsive to ECT    # Forgetfulness - Could be manifestation of MDD however no improvement noted with ECT thus far. No signs or symptoms of infection at this time. Consider outpt neuro evaluation for early onset dementia?    #Major depressive disorder - management per psych. Currently started on remeron.     # Hyponatremia -Labs from april 16th reviewed. Hyponatremia resolved.     # Hypothyroidism - c/w Synthroid, TSH wnl    #HTN - c/w valsartan. BP acceptable. Outpt follow up recommended. Thiazide stopped due to hyponatremia.     plan discussed with patient.     Medically no active interventions recommended at this time.

## 2018-04-20 NOTE — PROGRESS NOTE ADULT - SUBJECTIVE AND OBJECTIVE BOX
CC/Reason for Consult: HTN, Hypothyroid, forgetfulness Dr. Khalil's request    SUBJECTIVE / OVERNIGHT EVENTS: No overnight events. Patient seen in the dining area. She states that she continues to have forgetfulness. Unable to clearly state the duration but says it is ongoing for few months. She is getting frustrated as she has not seen any improvement with ECT. She was started on remeron and seroquel recently however she is unsure if medications are helpful. She feels Seroquel knocked her out. No headache, sinus symptoms, cough, SOB, chest pain, abdominal pain, dysuria, rashes or LE edema. Reports mild constipation but feels that it is because she isnt drinking coffee here. She endorses feeling depressed mitra being here at [facility].      MEDICATIONS  (STANDING):  atorvastatin 20 milliGRAM(s) Oral at bedtime  docusate sodium 100 milliGRAM(s) Oral two times a day  levothyroxine 50 MICROGram(s) Oral daily  mirtazapine 15 milliGRAM(s) Oral at bedtime  QUEtiapine 50 milliGRAM(s) Oral at bedtime  senna 2 Tablet(s) Oral at bedtime  valsartan 320 milliGRAM(s) Oral daily    MEDICATIONS  (PRN):  ibuprofen  Tablet 600 milliGRAM(s) Oral every 6 hours PRN Pain    Vital Signs Last 24 Hrs  T(C): 36.6 (20 Apr 2018 07:38), Max: 36.6 (20 Apr 2018 07:38)  T(F): 97.9 (20 Apr 2018 07:38), Max: 97.9 (20 Apr 2018 07:38)  HR: --98  BP: --144/92  BP(mean): --  RR: 20 (20 Apr 2018 07:38) (20 - 20)  SpO2: --    PHYSICAL EXAM:  GENERAL: NAD, well-developed  HEAD:  Atraumatic, Normocephalic  EYES: EOMI, conjunctiva and sclera clear  NECK: Supple, No JVD  CHEST/LUNG: Clear to auscultation bilaterally; No wheeze  HEART: Regular rate and rhythm;  ABDOMEN: Soft, Nontender, Nondistended;   EXTREMITIES:  2+ Peripheral Pulses, No clubbing, cyanosis, or edema  PSYCH: AAOx3  NEUROLOGY: non-focal  SKIN: No rashes or lesions    LABS:    Comprehensive Metabolic Panel (04.16.18 @ 08:25)    Sodium, Serum: 134 mmol/L    Potassium, Serum: 3.7 mmol/L    Chloride, Serum: 92 mmol/L    Carbon Dioxide, Serum: 30 mmol/L    Blood Urea Nitrogen, Serum: 7 mg/dL    Creatinine, Serum: 0.61 mg/dL    Glucose, Serum: 112 mg/dL    Calcium, Total Serum: 9.2 mg/dL    Protein Total, Serum: 7.5 g/dL    Albumin, Serum: 4.3 g/dL    Bilirubin Total, Serum: 0.6 mg/dL    Alkaline Phosphatase, Serum: 31 u/L    Aspartate Aminotransferase (AST/SGOT): 32 u/L    Alanine Aminotransferase (ALT/SGPT): 36 u/L    eGFR if Non : 96: The units for eGFR are ml/min/1.73m2 (normalized body  surface area). The eGFR is calculated from a serum  creatinine using the CKD-EPI equation. Other variables  required for calculation are race, age and sex. Among  patients with chronic kidney disease (CKD), the eGFR is  useful in determining the stage of disease according to  KDOQI CKD classification. All eGFR results are reported  numerically with the following interpretation.    GFR  (ml/min/1.73 m2)          W/KIDNEY DAMAGE    W/O KIDNEY DMG  ==========================================================  >= 90.......................Stage 1..............Normal  60-89.......................Stage 2...........Decreased GFR  30-59.......................Stage 3..............Stage 3  15-29.......................Stage 4..............Stage 4  < 15........................Stage 5..............Stage 5    Each stage of CKD assumes that the associated GFR level  has been in effect for at least 3 months. Determination of  stages one and two (with eGFR > 59ml/min/m2) requires  estimation of kidney damage for at least 3 months as  defined by structural or functional abnormalities.    Limitations: All estimates of GFR will be less accurate  for patients at extremes of muscle mass (including but  not limited to frail elderly, critically ill, or cancer  patients), those with unusual diets, and those with  conditions associated with reduced secretion or  extrarenal elimination of creatinine. The eGFR equation  is not recommended for use in patients with unstable  creatinine levels. mL/min    eGFR if : 111 mL/min      RADIOLOGY & ADDITIONAL TESTS:    Imaging Personally Reviewed:    Consultant(s) Notes Reviewed:      Care Discussed with Consultants/Other Providers:

## 2018-04-21 RX ADMIN — Medication 100 MILLIGRAM(S): at 22:09

## 2018-04-21 RX ADMIN — Medication 100 MILLIGRAM(S): at 08:33

## 2018-04-21 RX ADMIN — ATORVASTATIN CALCIUM 20 MILLIGRAM(S): 80 TABLET, FILM COATED ORAL at 22:09

## 2018-04-21 RX ADMIN — SENNA PLUS 2 TABLET(S): 8.6 TABLET ORAL at 22:09

## 2018-04-21 RX ADMIN — Medication 50 MICROGRAM(S): at 08:33

## 2018-04-21 RX ADMIN — MIRTAZAPINE 15 MILLIGRAM(S): 45 TABLET, ORALLY DISINTEGRATING ORAL at 22:09

## 2018-04-21 RX ADMIN — QUETIAPINE FUMARATE 50 MILLIGRAM(S): 200 TABLET, FILM COATED ORAL at 22:09

## 2018-04-21 RX ADMIN — VALSARTAN 320 MILLIGRAM(S): 80 TABLET ORAL at 08:33

## 2018-04-22 RX ADMIN — QUETIAPINE FUMARATE 50 MILLIGRAM(S): 200 TABLET, FILM COATED ORAL at 21:34

## 2018-04-22 RX ADMIN — SENNA PLUS 2 TABLET(S): 8.6 TABLET ORAL at 21:34

## 2018-04-22 RX ADMIN — Medication 100 MILLIGRAM(S): at 09:08

## 2018-04-22 RX ADMIN — Medication 50 MICROGRAM(S): at 09:08

## 2018-04-22 RX ADMIN — ATORVASTATIN CALCIUM 20 MILLIGRAM(S): 80 TABLET, FILM COATED ORAL at 21:34

## 2018-04-22 RX ADMIN — VALSARTAN 320 MILLIGRAM(S): 80 TABLET ORAL at 09:07

## 2018-04-22 RX ADMIN — MIRTAZAPINE 15 MILLIGRAM(S): 45 TABLET, ORALLY DISINTEGRATING ORAL at 21:34

## 2018-04-22 RX ADMIN — Medication 100 MILLIGRAM(S): at 21:34

## 2018-04-23 PROCEDURE — 90870 ELECTROCONVULSIVE THERAPY: CPT

## 2018-04-23 PROCEDURE — 90853 GROUP PSYCHOTHERAPY: CPT

## 2018-04-23 PROCEDURE — 99232 SBSQ HOSP IP/OBS MODERATE 35: CPT | Mod: 25

## 2018-04-23 RX ADMIN — SENNA PLUS 2 TABLET(S): 8.6 TABLET ORAL at 20:59

## 2018-04-23 RX ADMIN — Medication 100 MILLIGRAM(S): at 17:12

## 2018-04-23 RX ADMIN — Medication 50 MICROGRAM(S): at 09:33

## 2018-04-23 RX ADMIN — Medication 600 MILLIGRAM(S): at 14:37

## 2018-04-23 RX ADMIN — QUETIAPINE FUMARATE 50 MILLIGRAM(S): 200 TABLET, FILM COATED ORAL at 20:59

## 2018-04-23 RX ADMIN — MIRTAZAPINE 15 MILLIGRAM(S): 45 TABLET, ORALLY DISINTEGRATING ORAL at 20:59

## 2018-04-23 RX ADMIN — Medication 100 MILLIGRAM(S): at 20:59

## 2018-04-23 RX ADMIN — ATORVASTATIN CALCIUM 20 MILLIGRAM(S): 80 TABLET, FILM COATED ORAL at 20:59

## 2018-04-23 RX ADMIN — VALSARTAN 320 MILLIGRAM(S): 80 TABLET ORAL at 09:33

## 2018-04-24 PROCEDURE — 99232 SBSQ HOSP IP/OBS MODERATE 35: CPT | Mod: 25

## 2018-04-24 PROCEDURE — 90834 PSYTX W PT 45 MINUTES: CPT | Mod: 59

## 2018-04-24 PROCEDURE — 90853 GROUP PSYCHOTHERAPY: CPT

## 2018-04-24 RX ADMIN — QUETIAPINE FUMARATE 50 MILLIGRAM(S): 200 TABLET, FILM COATED ORAL at 21:25

## 2018-04-24 RX ADMIN — SENNA PLUS 2 TABLET(S): 8.6 TABLET ORAL at 21:25

## 2018-04-24 RX ADMIN — Medication 100 MILLIGRAM(S): at 21:25

## 2018-04-24 RX ADMIN — Medication 50 MICROGRAM(S): at 09:57

## 2018-04-24 RX ADMIN — Medication 100 MILLIGRAM(S): at 09:57

## 2018-04-24 RX ADMIN — MIRTAZAPINE 15 MILLIGRAM(S): 45 TABLET, ORALLY DISINTEGRATING ORAL at 21:25

## 2018-04-24 RX ADMIN — ATORVASTATIN CALCIUM 20 MILLIGRAM(S): 80 TABLET, FILM COATED ORAL at 21:25

## 2018-04-24 RX ADMIN — VALSARTAN 320 MILLIGRAM(S): 80 TABLET ORAL at 09:57

## 2018-04-25 PROCEDURE — 99232 SBSQ HOSP IP/OBS MODERATE 35: CPT | Mod: 25

## 2018-04-25 PROCEDURE — 90870 ELECTROCONVULSIVE THERAPY: CPT

## 2018-04-25 RX ADMIN — Medication 100 MILLIGRAM(S): at 15:00

## 2018-04-25 RX ADMIN — Medication 50 MICROGRAM(S): at 09:31

## 2018-04-25 RX ADMIN — Medication 100 MILLIGRAM(S): at 21:18

## 2018-04-25 RX ADMIN — VALSARTAN 320 MILLIGRAM(S): 80 TABLET ORAL at 09:31

## 2018-04-25 RX ADMIN — QUETIAPINE FUMARATE 50 MILLIGRAM(S): 200 TABLET, FILM COATED ORAL at 21:18

## 2018-04-25 RX ADMIN — ATORVASTATIN CALCIUM 20 MILLIGRAM(S): 80 TABLET, FILM COATED ORAL at 21:18

## 2018-04-25 RX ADMIN — SENNA PLUS 2 TABLET(S): 8.6 TABLET ORAL at 21:18

## 2018-04-25 RX ADMIN — MIRTAZAPINE 15 MILLIGRAM(S): 45 TABLET, ORALLY DISINTEGRATING ORAL at 21:18

## 2018-04-26 PROCEDURE — 99232 SBSQ HOSP IP/OBS MODERATE 35: CPT

## 2018-04-26 RX ADMIN — Medication 100 MILLIGRAM(S): at 21:57

## 2018-04-26 RX ADMIN — Medication 50 MICROGRAM(S): at 09:36

## 2018-04-26 RX ADMIN — Medication 100 MILLIGRAM(S): at 09:36

## 2018-04-26 RX ADMIN — SENNA PLUS 2 TABLET(S): 8.6 TABLET ORAL at 21:57

## 2018-04-26 RX ADMIN — MIRTAZAPINE 15 MILLIGRAM(S): 45 TABLET, ORALLY DISINTEGRATING ORAL at 21:57

## 2018-04-26 RX ADMIN — ATORVASTATIN CALCIUM 20 MILLIGRAM(S): 80 TABLET, FILM COATED ORAL at 21:57

## 2018-04-26 RX ADMIN — QUETIAPINE FUMARATE 50 MILLIGRAM(S): 200 TABLET, FILM COATED ORAL at 21:57

## 2018-04-26 RX ADMIN — VALSARTAN 320 MILLIGRAM(S): 80 TABLET ORAL at 09:36

## 2018-04-27 PROCEDURE — 90870 ELECTROCONVULSIVE THERAPY: CPT

## 2018-04-27 PROCEDURE — 99232 SBSQ HOSP IP/OBS MODERATE 35: CPT | Mod: 25

## 2018-04-27 RX ADMIN — Medication 50 MICROGRAM(S): at 08:44

## 2018-04-27 RX ADMIN — Medication 100 MILLIGRAM(S): at 08:44

## 2018-04-27 RX ADMIN — VALSARTAN 320 MILLIGRAM(S): 80 TABLET ORAL at 08:44

## 2018-04-27 RX ADMIN — SENNA PLUS 2 TABLET(S): 8.6 TABLET ORAL at 20:59

## 2018-04-27 RX ADMIN — QUETIAPINE FUMARATE 50 MILLIGRAM(S): 200 TABLET, FILM COATED ORAL at 20:59

## 2018-04-27 RX ADMIN — ATORVASTATIN CALCIUM 20 MILLIGRAM(S): 80 TABLET, FILM COATED ORAL at 20:59

## 2018-04-27 RX ADMIN — Medication 100 MILLIGRAM(S): at 20:59

## 2018-04-27 RX ADMIN — MIRTAZAPINE 15 MILLIGRAM(S): 45 TABLET, ORALLY DISINTEGRATING ORAL at 20:59

## 2018-04-28 RX ADMIN — Medication 100 MILLIGRAM(S): at 08:53

## 2018-04-28 RX ADMIN — ATORVASTATIN CALCIUM 20 MILLIGRAM(S): 80 TABLET, FILM COATED ORAL at 21:08

## 2018-04-28 RX ADMIN — VALSARTAN 320 MILLIGRAM(S): 80 TABLET ORAL at 08:53

## 2018-04-28 RX ADMIN — SENNA PLUS 2 TABLET(S): 8.6 TABLET ORAL at 21:08

## 2018-04-28 RX ADMIN — Medication 50 MICROGRAM(S): at 08:53

## 2018-04-28 RX ADMIN — Medication 100 MILLIGRAM(S): at 21:08

## 2018-04-28 RX ADMIN — QUETIAPINE FUMARATE 50 MILLIGRAM(S): 200 TABLET, FILM COATED ORAL at 21:08

## 2018-04-28 RX ADMIN — MIRTAZAPINE 15 MILLIGRAM(S): 45 TABLET, ORALLY DISINTEGRATING ORAL at 21:08

## 2018-04-29 RX ADMIN — Medication 50 MICROGRAM(S): at 09:11

## 2018-04-29 RX ADMIN — SENNA PLUS 2 TABLET(S): 8.6 TABLET ORAL at 23:36

## 2018-04-29 RX ADMIN — QUETIAPINE FUMARATE 50 MILLIGRAM(S): 200 TABLET, FILM COATED ORAL at 23:36

## 2018-04-29 RX ADMIN — ATORVASTATIN CALCIUM 20 MILLIGRAM(S): 80 TABLET, FILM COATED ORAL at 23:36

## 2018-04-29 RX ADMIN — VALSARTAN 320 MILLIGRAM(S): 80 TABLET ORAL at 09:11

## 2018-04-29 RX ADMIN — MIRTAZAPINE 15 MILLIGRAM(S): 45 TABLET, ORALLY DISINTEGRATING ORAL at 23:36

## 2018-04-29 RX ADMIN — Medication 100 MILLIGRAM(S): at 23:36

## 2018-04-29 RX ADMIN — Medication 100 MILLIGRAM(S): at 09:11

## 2018-04-30 PROCEDURE — 99232 SBSQ HOSP IP/OBS MODERATE 35: CPT | Mod: 25

## 2018-04-30 PROCEDURE — 90870 ELECTROCONVULSIVE THERAPY: CPT

## 2018-04-30 RX ORDER — DOCUSATE SODIUM 100 MG
1 CAPSULE ORAL
Qty: 60 | Refills: 0 | OUTPATIENT
Start: 2018-04-30 | End: 2018-05-29

## 2018-04-30 RX ORDER — MIRTAZAPINE 45 MG/1
1 TABLET, ORALLY DISINTEGRATING ORAL
Qty: 30 | Refills: 0 | OUTPATIENT
Start: 2018-04-30 | End: 2018-05-29

## 2018-04-30 RX ORDER — QUETIAPINE FUMARATE 200 MG/1
1 TABLET, FILM COATED ORAL
Qty: 30 | Refills: 0
Start: 2018-04-30 | End: 2018-05-29

## 2018-04-30 RX ORDER — SENNA PLUS 8.6 MG/1
2 TABLET ORAL
Qty: 60 | Refills: 0 | OUTPATIENT
Start: 2018-04-30 | End: 2018-05-29

## 2018-04-30 RX ORDER — LEVOTHYROXINE SODIUM 125 MCG
1 TABLET ORAL
Qty: 30 | Refills: 0
Start: 2018-04-30 | End: 2018-05-29

## 2018-04-30 RX ORDER — ATORVASTATIN CALCIUM 80 MG/1
1 TABLET, FILM COATED ORAL
Qty: 30 | Refills: 0
Start: 2018-04-30 | End: 2018-05-29

## 2018-04-30 RX ORDER — VALSARTAN 80 MG/1
1 TABLET ORAL
Qty: 30 | Refills: 0 | OUTPATIENT
Start: 2018-04-30 | End: 2018-05-29

## 2018-04-30 RX ADMIN — SENNA PLUS 2 TABLET(S): 8.6 TABLET ORAL at 21:19

## 2018-04-30 RX ADMIN — MIRTAZAPINE 15 MILLIGRAM(S): 45 TABLET, ORALLY DISINTEGRATING ORAL at 21:19

## 2018-04-30 RX ADMIN — Medication 100 MILLIGRAM(S): at 21:19

## 2018-04-30 RX ADMIN — QUETIAPINE FUMARATE 50 MILLIGRAM(S): 200 TABLET, FILM COATED ORAL at 21:19

## 2018-04-30 RX ADMIN — VALSARTAN 320 MILLIGRAM(S): 80 TABLET ORAL at 08:46

## 2018-04-30 RX ADMIN — Medication 50 MICROGRAM(S): at 08:46

## 2018-04-30 RX ADMIN — ATORVASTATIN CALCIUM 20 MILLIGRAM(S): 80 TABLET, FILM COATED ORAL at 21:19

## 2018-04-30 RX ADMIN — Medication 100 MILLIGRAM(S): at 08:46

## 2018-05-01 VITALS — RESPIRATION RATE: 18 BRPM

## 2018-05-01 PROCEDURE — 99239 HOSP IP/OBS DSCHRG MGMT >30: CPT

## 2018-05-01 RX ADMIN — Medication 50 MICROGRAM(S): at 09:08

## 2018-05-01 RX ADMIN — VALSARTAN 320 MILLIGRAM(S): 80 TABLET ORAL at 09:08

## 2018-05-01 RX ADMIN — Medication 100 MILLIGRAM(S): at 09:08

## 2018-05-02 ENCOUNTER — OUTPATIENT (OUTPATIENT)
Dept: OUTPATIENT SERVICES | Facility: HOSPITAL | Age: 63
LOS: 1 days | Discharge: HOME | End: 2018-05-02
Payer: MEDICARE

## 2018-05-02 DIAGNOSIS — Z90.49 ACQUIRED ABSENCE OF OTHER SPECIFIED PARTS OF DIGESTIVE TRACT: Chronic | ICD-10-CM

## 2018-05-02 DIAGNOSIS — F33.9 MAJOR DEPRESSIVE DISORDER, RECURRENT, UNSPECIFIED: ICD-10-CM

## 2018-05-03 PROCEDURE — 90870 ELECTROCONVULSIVE THERAPY: CPT

## 2018-05-08 PROCEDURE — 90870 ELECTROCONVULSIVE THERAPY: CPT

## 2018-05-16 ENCOUNTER — INPATIENT (INPATIENT)
Facility: HOSPITAL | Age: 63
LOS: 0 days | Discharge: HOME | End: 2018-05-17
Attending: INTERNAL MEDICINE | Admitting: INTERNAL MEDICINE
Payer: COMMERCIAL

## 2018-05-16 VITALS
SYSTOLIC BLOOD PRESSURE: 144 MMHG | RESPIRATION RATE: 18 BRPM | OXYGEN SATURATION: 98 % | DIASTOLIC BLOOD PRESSURE: 90 MMHG | HEART RATE: 143 BPM | TEMPERATURE: 98 F

## 2018-05-16 DIAGNOSIS — Z90.49 ACQUIRED ABSENCE OF OTHER SPECIFIED PARTS OF DIGESTIVE TRACT: Chronic | ICD-10-CM

## 2018-05-16 DIAGNOSIS — I48.91 UNSPECIFIED ATRIAL FIBRILLATION: ICD-10-CM

## 2018-05-16 DIAGNOSIS — F41.9 ANXIETY DISORDER, UNSPECIFIED: ICD-10-CM

## 2018-05-16 DIAGNOSIS — E87.6 HYPOKALEMIA: ICD-10-CM

## 2018-05-16 DIAGNOSIS — I10 ESSENTIAL (PRIMARY) HYPERTENSION: ICD-10-CM

## 2018-05-16 DIAGNOSIS — Z29.9 ENCOUNTER FOR PROPHYLACTIC MEASURES, UNSPECIFIED: ICD-10-CM

## 2018-05-16 DIAGNOSIS — E03.9 HYPOTHYROIDISM, UNSPECIFIED: ICD-10-CM

## 2018-05-16 DIAGNOSIS — E78.5 HYPERLIPIDEMIA, UNSPECIFIED: ICD-10-CM

## 2018-05-16 LAB
ALBUMIN SERPL ELPH-MCNC: 4.6 G/DL — SIGNIFICANT CHANGE UP (ref 3.3–5)
ALP SERPL-CCNC: 32 U/L — LOW (ref 40–120)
ALT FLD-CCNC: 18 U/L — SIGNIFICANT CHANGE UP (ref 4–33)
APPEARANCE UR: CLEAR — SIGNIFICANT CHANGE UP
APTT BLD: 28.3 SEC — SIGNIFICANT CHANGE UP (ref 27.5–37.4)
AST SERPL-CCNC: 26 U/L — SIGNIFICANT CHANGE UP (ref 4–32)
BASOPHILS # BLD AUTO: 0.04 K/UL — SIGNIFICANT CHANGE UP (ref 0–0.2)
BASOPHILS NFR BLD AUTO: 0.5 % — SIGNIFICANT CHANGE UP (ref 0–2)
BILIRUB SERPL-MCNC: 0.5 MG/DL — SIGNIFICANT CHANGE UP (ref 0.2–1.2)
BILIRUB UR-MCNC: NEGATIVE — SIGNIFICANT CHANGE UP
BLOOD UR QL VISUAL: HIGH
BUN SERPL-MCNC: 9 MG/DL — SIGNIFICANT CHANGE UP (ref 7–23)
CALCIUM SERPL-MCNC: 9.2 MG/DL — SIGNIFICANT CHANGE UP (ref 8.4–10.5)
CHLORIDE SERPL-SCNC: 101 MMOL/L — SIGNIFICANT CHANGE UP (ref 98–107)
CO2 SERPL-SCNC: 28 MMOL/L — SIGNIFICANT CHANGE UP (ref 22–31)
COLOR SPEC: SIGNIFICANT CHANGE UP
CREAT SERPL-MCNC: 0.62 MG/DL — SIGNIFICANT CHANGE UP (ref 0.5–1.3)
EOSINOPHIL # BLD AUTO: 0.05 K/UL — SIGNIFICANT CHANGE UP (ref 0–0.5)
EOSINOPHIL NFR BLD AUTO: 0.7 % — SIGNIFICANT CHANGE UP (ref 0–6)
GLUCOSE BLDC GLUCOMTR-MCNC: 104 MG/DL — HIGH (ref 70–99)
GLUCOSE SERPL-MCNC: 124 MG/DL — HIGH (ref 70–99)
GLUCOSE UR-MCNC: NEGATIVE — SIGNIFICANT CHANGE UP
HCT VFR BLD CALC: 40.3 % — SIGNIFICANT CHANGE UP (ref 34.5–45)
HGB BLD-MCNC: 13.8 G/DL — SIGNIFICANT CHANGE UP (ref 11.5–15.5)
IMM GRANULOCYTES # BLD AUTO: 0.02 # — SIGNIFICANT CHANGE UP
IMM GRANULOCYTES NFR BLD AUTO: 0.3 % — SIGNIFICANT CHANGE UP (ref 0–1.5)
INR BLD: 1.02 — SIGNIFICANT CHANGE UP (ref 0.88–1.17)
KETONES UR-MCNC: SIGNIFICANT CHANGE UP
LEUKOCYTE ESTERASE UR-ACNC: NEGATIVE — SIGNIFICANT CHANGE UP
LYMPHOCYTES # BLD AUTO: 1.28 K/UL — SIGNIFICANT CHANGE UP (ref 1–3.3)
LYMPHOCYTES # BLD AUTO: 17.1 % — SIGNIFICANT CHANGE UP (ref 13–44)
MCHC RBC-ENTMCNC: 34.1 PG — HIGH (ref 27–34)
MCHC RBC-ENTMCNC: 34.2 % — SIGNIFICANT CHANGE UP (ref 32–36)
MCV RBC AUTO: 99.5 FL — SIGNIFICANT CHANGE UP (ref 80–100)
MONOCYTES # BLD AUTO: 0.68 K/UL — SIGNIFICANT CHANGE UP (ref 0–0.9)
MONOCYTES NFR BLD AUTO: 9.1 % — SIGNIFICANT CHANGE UP (ref 2–14)
NEUTROPHILS # BLD AUTO: 5.4 K/UL — SIGNIFICANT CHANGE UP (ref 1.8–7.4)
NEUTROPHILS NFR BLD AUTO: 72.3 % — SIGNIFICANT CHANGE UP (ref 43–77)
NITRITE UR-MCNC: NEGATIVE — SIGNIFICANT CHANGE UP
NRBC # FLD: 0 — SIGNIFICANT CHANGE UP
PH UR: 7 — SIGNIFICANT CHANGE UP (ref 4.6–8)
PLATELET # BLD AUTO: 231 K/UL — SIGNIFICANT CHANGE UP (ref 150–400)
PMV BLD: 11.2 FL — SIGNIFICANT CHANGE UP (ref 7–13)
POTASSIUM SERPL-MCNC: 2.9 MMOL/L — CRITICAL LOW (ref 3.5–5.3)
POTASSIUM SERPL-SCNC: 2.9 MMOL/L — CRITICAL LOW (ref 3.5–5.3)
PROT SERPL-MCNC: 7.9 G/DL — SIGNIFICANT CHANGE UP (ref 6–8.3)
PROT UR-MCNC: 30 MG/DL — HIGH
PROTHROM AB SERPL-ACNC: 11.7 SEC — SIGNIFICANT CHANGE UP (ref 9.8–13.1)
RBC # BLD: 4.05 M/UL — SIGNIFICANT CHANGE UP (ref 3.8–5.2)
RBC # FLD: 14.5 % — SIGNIFICANT CHANGE UP (ref 10.3–14.5)
RBC CASTS # UR COMP ASSIST: SIGNIFICANT CHANGE UP (ref 0–?)
SODIUM SERPL-SCNC: 141 MMOL/L — SIGNIFICANT CHANGE UP (ref 135–145)
SP GR SPEC: 1.01 — SIGNIFICANT CHANGE UP (ref 1–1.04)
TROPONIN T SERPL-MCNC: < 0.06 NG/ML — SIGNIFICANT CHANGE UP (ref 0–0.06)
TSH SERPL-MCNC: 1.86 UIU/ML — SIGNIFICANT CHANGE UP (ref 0.27–4.2)
TSH SERPL-MCNC: 1.89 UIU/ML — SIGNIFICANT CHANGE UP (ref 0.27–4.2)
UROBILINOGEN FLD QL: NORMAL MG/DL — SIGNIFICANT CHANGE UP
WBC # BLD: 7.47 K/UL — SIGNIFICANT CHANGE UP (ref 3.8–10.5)
WBC # FLD AUTO: 7.47 K/UL — SIGNIFICANT CHANGE UP (ref 3.8–10.5)
WBC UR QL: SIGNIFICANT CHANGE UP (ref 0–?)

## 2018-05-16 PROCEDURE — 71045 X-RAY EXAM CHEST 1 VIEW: CPT | Mod: 26

## 2018-05-16 PROCEDURE — 90870 ELECTROCONVULSIVE THERAPY: CPT

## 2018-05-16 RX ORDER — VALSARTAN 80 MG/1
320 TABLET ORAL DAILY
Qty: 0 | Refills: 0 | Status: DISCONTINUED | OUTPATIENT
Start: 2018-05-16 | End: 2018-05-17

## 2018-05-16 RX ORDER — LEVOTHYROXINE SODIUM 125 MCG
50 TABLET ORAL DAILY
Qty: 0 | Refills: 0 | Status: DISCONTINUED | OUTPATIENT
Start: 2018-05-16 | End: 2018-05-17

## 2018-05-16 RX ORDER — IBUPROFEN 200 MG
400 TABLET ORAL ONCE
Qty: 0 | Refills: 0 | Status: COMPLETED | OUTPATIENT
Start: 2018-05-16 | End: 2018-05-16

## 2018-05-16 RX ORDER — ATORVASTATIN CALCIUM 80 MG/1
20 TABLET, FILM COATED ORAL AT BEDTIME
Qty: 0 | Refills: 0 | Status: DISCONTINUED | OUTPATIENT
Start: 2018-05-16 | End: 2018-05-17

## 2018-05-16 RX ORDER — HYDROCHLOROTHIAZIDE 25 MG
25 TABLET ORAL DAILY
Qty: 0 | Refills: 0 | Status: DISCONTINUED | OUTPATIENT
Start: 2018-05-16 | End: 2018-05-17

## 2018-05-16 RX ORDER — ENOXAPARIN SODIUM 100 MG/ML
80 INJECTION SUBCUTANEOUS ONCE
Qty: 0 | Refills: 0 | Status: COMPLETED | OUTPATIENT
Start: 2018-05-16 | End: 2018-05-16

## 2018-05-16 RX ORDER — SODIUM CHLORIDE 9 MG/ML
1000 INJECTION INTRAMUSCULAR; INTRAVENOUS; SUBCUTANEOUS ONCE
Qty: 0 | Refills: 0 | Status: COMPLETED | OUTPATIENT
Start: 2018-05-16 | End: 2018-05-16

## 2018-05-16 RX ORDER — ENOXAPARIN SODIUM 100 MG/ML
80 INJECTION SUBCUTANEOUS
Qty: 0 | Refills: 0 | Status: DISCONTINUED | OUTPATIENT
Start: 2018-05-17 | End: 2018-05-17

## 2018-05-16 RX ORDER — POTASSIUM CHLORIDE 20 MEQ
40 PACKET (EA) ORAL EVERY 4 HOURS
Qty: 0 | Refills: 0 | Status: COMPLETED | OUTPATIENT
Start: 2018-05-16 | End: 2018-05-17

## 2018-05-16 RX ORDER — QUETIAPINE FUMARATE 200 MG/1
50 TABLET, FILM COATED ORAL AT BEDTIME
Qty: 0 | Refills: 0 | Status: DISCONTINUED | OUTPATIENT
Start: 2018-05-16 | End: 2018-05-17

## 2018-05-16 RX ORDER — METOPROLOL TARTRATE 50 MG
5 TABLET ORAL ONCE
Qty: 0 | Refills: 0 | Status: COMPLETED | OUTPATIENT
Start: 2018-05-16 | End: 2018-05-16

## 2018-05-16 RX ADMIN — ENOXAPARIN SODIUM 80 MILLIGRAM(S): 100 INJECTION SUBCUTANEOUS at 20:21

## 2018-05-16 RX ADMIN — SODIUM CHLORIDE 1000 MILLILITER(S): 9 INJECTION INTRAMUSCULAR; INTRAVENOUS; SUBCUTANEOUS at 18:40

## 2018-05-16 RX ADMIN — Medication 40 MILLIEQUIVALENT(S): at 20:21

## 2018-05-16 RX ADMIN — Medication 400 MILLIGRAM(S): at 23:36

## 2018-05-16 RX ADMIN — Medication 5 MILLIGRAM(S): at 23:36

## 2018-05-16 NOTE — ED ADULT NURSE REASSESSMENT NOTE - NS ED NURSE REASSESS COMMENT FT1
Respirations are even and unlabored on room air. Pt. is admitted to Tele bed 707A, report given to JAZMYN Morales. Pt. is awaiting transport at present.

## 2018-05-16 NOTE — H&P ADULT - RS GEN PE MLT RESP DETAILS PC
no intercostal retractions/no rales/no wheezes/breath sounds equal/clear to auscultation bilaterally/respirations non-labored/normal/airway patent/no rhonchi/good air movement/no chest wall tenderness

## 2018-05-16 NOTE — ED ADULT TRIAGE NOTE - CHIEF COMPLAINT QUOTE
p/t sent from [place] for new onset of rapid a fib, s/p ECT treatment this afternoon, nad noted , p/t denies an y chest pain denies palpitations

## 2018-05-16 NOTE — H&P ADULT - NEGATIVE CARDIOVASCULAR SYMPTOMS
no dyspnea on exertion/no peripheral edema/no palpitations/no orthopnea/no paroxysmal nocturnal dyspnea/no chest pain

## 2018-05-16 NOTE — ED PROVIDER NOTE - ATTENDING CONTRIBUTION TO CARE
Liz: 64 yo female with no cardiac history but h/o refractory depression receiving ECT at [facility] sent in for new onset rapid afib s/p a session of ECT. Pt denies chest pain, SOB, dizziness and palpitations. No recent travel, LE pain or edema. No h/o thyroid problems. No abdominal pain, nausea or vomiting. Exam: well appearing, MMM, + irregular tachycardia, lungs CTA, abdomen is soft and nontender, No LE edema or calf TTP. 2+ distal pulses x 4 extremities. A/P- 64 yo female with new onset rapid a fib s/p ECT will rate control, anticoagulate- chads score2, check labs and reassess.

## 2018-05-16 NOTE — ED PROVIDER NOTE - OBJECTIVE STATEMENT
63F hx of HTN HLD anxiety presents s/p ECT today with c/f new onset afib, no prior hx of afib, not on AC, no hx of thyroid disease. NO recent cough congestion runny nose sore throat, no chest pain. Non smoker. No hx cardiac disease. Denies n/v/f/c/cp/sob. Denies headache, syncope, lightheadedness, dizziness. Denies abdominal pain. Denies dysuria, hematuria, hematochezia, BRBPR, tarry stools, diarrhea, constipation.

## 2018-05-16 NOTE — H&P ADULT - PROBLEM SELECTOR PLAN 2
K on admission noted to be 2.9 and patient currently getting supplemented with potassium  Will check Magnesium level and if low will supplement

## 2018-05-16 NOTE — H&P ADULT - GASTROINTESTINAL DETAILS
soft/no guarding/bowel sounds normal/normal/nontender/no rebound tenderness/no rigidity/no distention

## 2018-05-16 NOTE — ED PROVIDER NOTE - MEDICAL DECISION MAKING DETAILS
Ede PGY1: 63F hx of HTN HLD anxiety presents s/p ECT today with c/f new onset afib, no prior hx of afib, not on AC, no hx of thyroid disease. NO recent cough congestion runny nose sore throat, no chest pain. exam tachycardic to 140 irregular c/f afib w rvr, otherwise HD stable, no chest pain, lungs ctab abd ntnd, c/f new afib will rate control with iv diltiazem, eval for infectious etiology, labs, urine, cardiacs, tsh, cxr likely admit for new afib

## 2018-05-16 NOTE — ED ADULT NURSE NOTE - CHPI ED SYMPTOMS NEG
no chills/no nausea/no chest pain/no cough/no fever/no syncope/no back pain/no diaphoresis/no shortness of breath/no dizziness/no vomiting

## 2018-05-16 NOTE — H&P ADULT - NEGATIVE ENMT SYMPTOMS
no ear pain/no hearing difficulty/no sinus symptoms/no nasal congestion/no nasal discharge/no tinnitus/no vertigo

## 2018-05-16 NOTE — H&P ADULT - FAMILY HISTORY
Family history of hypertension     Family history of diabetes mellitus     Family history of prostate cancer     Mother  Still living? Unknown  Family history of cancer, Age at diagnosis: Age Unknown

## 2018-05-16 NOTE — ED PROVIDER NOTE - CRITICAL CARE PROVIDED
interpretation of diagnostic studies/consultation with other physicians/direct patient care (not related to procedure)/additional history taking/consult w/ pt's family directly relating to pts condition/documentation

## 2018-05-16 NOTE — H&P ADULT - NEGATIVE NEUROLOGICAL SYMPTOMS
no headache/no confusion/no tremors/no transient paralysis/no paresthesias/no generalized seizures/no weakness/no vertigo/no loss of sensation/no difficulty walking/no loss of consciousness/no focal seizures/no syncope

## 2018-05-16 NOTE — H&P ADULT - NEGATIVE GASTROINTESTINAL SYMPTOMS
no diarrhea/no vomiting/no change in bowel habits/no constipation/no nausea/no melena/no abdominal pain/no hematochezia

## 2018-05-16 NOTE — ED PROVIDER NOTE - PROGRESS NOTE DETAILS
Liz: Pt seen and examined by me on arrival- pt in rapid afib at 141. BP wnl, given 10mg IV diltiazem with good response. HR now 105,  systolic. IVF running. will repeat BP and continue to monitor. Liz: Pt Hr now 120 and BP elevated again 160/112, will give additional IV diltiazem and then PO dose.

## 2018-05-16 NOTE — H&P ADULT - NEGATIVE MUSCULOSKELETAL SYMPTOMS
no myalgia/no muscle cramps/no neck pain/no muscle weakness/no joint swelling/no arthralgia/no stiffness/no arthritis

## 2018-05-16 NOTE — H&P ADULT - ATTENDING COMMENTS
Patient seen and examined this am.  Agree with above.   -admitted with new onset afib  -tte with normal lv function  -nst normal  -no need for further ischemic eval  -appreciate ep eval  -dc planning      Chema Santos MD

## 2018-05-16 NOTE — ED ADULT NURSE REASSESSMENT NOTE - NS ED NURSE REASSESS COMMENT FT1
Report received from JAZMYN Quintana. Pt. received with 20 gauge IV in right ac and 22 gauge IV in right hand. Pt. is resting comfortably. Family at bedside. Will continue to monitor.

## 2018-05-16 NOTE — H&P ADULT - NSHPLABSRESULTS_GEN_ALL_CORE
13.8   7.47  )-----------( 231      ( 16 May 2018 18:35 )             40.3    05-16    141  |  101  |  9   ----------------------------<  124<H>  2.9<LL>   |  28  |  0.62    Ca    9.2      16 May 2018 18:35    TPro  7.9  /  Alb  4.6  /  TBili  0.5  /  DBili  x   /  AST  26  /  ALT  18  /  AlkPhos  32<L>  05-16    CARDIAC MARKERS ( 16 May 2018 18:35 )  x     / < 0.06 ng/mL / x     / x     / x        Prelim CXR: No focal consolidation    EKG: Atrial fibrillation with RVR at a rate of 134 with TWI in lead III, AVF and QTc of 477

## 2018-05-16 NOTE — H&P ADULT - HISTORY OF PRESENT ILLNESS
64 y/o F with PMH of HTN, Anxiety, HLD and hypothyroidism who presented s/p ECT therapy today with new onset atrial fibrillation. As per the patient she had an ECT therapy today at [facility] and as soon as she had finished the ECT therapy she was told that she was in new onset atrial fibrillation and needed to come to the ER. Patient stated that she was in her usual state of health and had no complaints. Patient denied any CP, SOB, palpitations, fevers, chills, N/V/D/C, abdominal pain, dysuria, melena, hematochezia, recent travel, sick contact, pleuritic or positional chest pain.     On ED admission EKG revealed Atrial fibrillation with RVR at a rate of 134 with TWI in lead III, AVF and QTc of 477, CE x 1: Negative, K: 2.9->supplementing,   Gluc: 124, Alk Phos: 32, UA: Negative. Prelim CXR: No focal consolidation. In the ED patient received IVP 10mg of Cardizem x 2 and PO Cardizem 60mg. When examined patient is resting in the stretcher and endorsed headache 2/2 to not eating, denied any CP, SOB or palpitations.

## 2018-05-16 NOTE — ED ADULT NURSE NOTE - PMH
Anxiety    Anxiety    HLD (hyperlipidemia)    HTN (hypertension)    HTN (hypertension)    UTI (urinary tract infection)

## 2018-05-16 NOTE — H&P ADULT - PROBLEM SELECTOR PLAN 1
[id number] Score of 2 and patient received 1x dose of Lovenox 1mg/kg in the ED for anticoagulation. Patient also received IV Cardizem 10mg x 2 and PO Cardizem 60mg in the ED  Will monitor on telemetry, serial EKG and Joellen prn for episodes of chest pain/palpitations or SOB  HgbA1C, lipid profile, CBC, CMP in am   TTE ordered to evaluate LVEF   Continue with Lovenox 1mg/kg BID for anticoagulation   Started on Cardizem 30mg q6hr for rate control   Consider EP consult in am

## 2018-05-16 NOTE — ED ADULT NURSE NOTE - OBJECTIVE STATEMENT
Patient reports receiving ECT therapy today at [facility]. After treatment was completed, EKG was performed and showed rapid atrial fibrillation. Patient denies any hx of irregular rhythm, or cardiac history. Patient denies any feelings of palpitations, chest pain, SOB, or syncope. Patient's EKG shows rapid afib, cardizem 10 mg IV given.

## 2018-05-16 NOTE — H&P ADULT - PROBLEM SELECTOR PLAN 7
Already therapeutic while on Lovenox 1mg/kg for anticoagulation for new onset atrial fibrillation with RVR

## 2018-05-16 NOTE — H&P ADULT - NEGATIVE OPHTHALMOLOGIC SYMPTOMS
no discharge R/no blurred vision R/no discharge L/no lacrimation L/no lacrimation R/no diplopia/no photophobia/no blurred vision L

## 2018-05-16 NOTE — ED ADULT NURSE NOTE - CHIEF COMPLAINT QUOTE
p/t sent from [place] for new onset of rapid a fib, s/p ECT treatment this afternoon, nad noted , p/t denies an y chest pain denies palpitations

## 2018-05-17 ENCOUNTER — TRANSCRIPTION ENCOUNTER (OUTPATIENT)
Age: 63
End: 2018-05-17

## 2018-05-17 VITALS
HEART RATE: 93 BPM | DIASTOLIC BLOOD PRESSURE: 82 MMHG | RESPIRATION RATE: 16 BRPM | OXYGEN SATURATION: 99 % | SYSTOLIC BLOOD PRESSURE: 145 MMHG | TEMPERATURE: 98 F

## 2018-05-17 LAB
BUN SERPL-MCNC: 7 MG/DL — SIGNIFICANT CHANGE UP (ref 7–23)
CALCIUM SERPL-MCNC: 9.3 MG/DL — SIGNIFICANT CHANGE UP (ref 8.4–10.5)
CHLORIDE SERPL-SCNC: 103 MMOL/L — SIGNIFICANT CHANGE UP (ref 98–107)
CHOLEST SERPL-MCNC: 184 MG/DL — SIGNIFICANT CHANGE UP (ref 120–199)
CK MB BLD-MCNC: 4.59 NG/ML — SIGNIFICANT CHANGE UP (ref 1–4.7)
CK SERPL-CCNC: 215 U/L — HIGH (ref 25–170)
CO2 SERPL-SCNC: 27 MMOL/L — SIGNIFICANT CHANGE UP (ref 22–31)
CREAT SERPL-MCNC: 0.51 MG/DL — SIGNIFICANT CHANGE UP (ref 0.5–1.3)
GLUCOSE SERPL-MCNC: 89 MG/DL — SIGNIFICANT CHANGE UP (ref 70–99)
HBA1C BLD-MCNC: 5.6 % — SIGNIFICANT CHANGE UP (ref 4–5.6)
HCT VFR BLD CALC: 38.9 % — SIGNIFICANT CHANGE UP (ref 34.5–45)
HDLC SERPL-MCNC: 56 MG/DL — SIGNIFICANT CHANGE UP (ref 45–65)
HGB BLD-MCNC: 13.4 G/DL — SIGNIFICANT CHANGE UP (ref 11.5–15.5)
LIPID PNL WITH DIRECT LDL SERPL: 122 MG/DL — SIGNIFICANT CHANGE UP
MAGNESIUM SERPL-MCNC: 1.9 MG/DL — SIGNIFICANT CHANGE UP (ref 1.6–2.6)
MAGNESIUM SERPL-MCNC: 2.2 MG/DL — SIGNIFICANT CHANGE UP (ref 1.6–2.6)
MCHC RBC-ENTMCNC: 33.8 PG — SIGNIFICANT CHANGE UP (ref 27–34)
MCHC RBC-ENTMCNC: 34.4 % — SIGNIFICANT CHANGE UP (ref 32–36)
MCV RBC AUTO: 98 FL — SIGNIFICANT CHANGE UP (ref 80–100)
NRBC # FLD: 0 — SIGNIFICANT CHANGE UP
PHOSPHATE SERPL-MCNC: 2.9 MG/DL — SIGNIFICANT CHANGE UP (ref 2.5–4.5)
PLATELET # BLD AUTO: 229 K/UL — SIGNIFICANT CHANGE UP (ref 150–400)
PMV BLD: 11.2 FL — SIGNIFICANT CHANGE UP (ref 7–13)
POTASSIUM SERPL-MCNC: 3.2 MMOL/L — LOW (ref 3.5–5.3)
POTASSIUM SERPL-SCNC: 3.2 MMOL/L — LOW (ref 3.5–5.3)
RBC # BLD: 3.97 M/UL — SIGNIFICANT CHANGE UP (ref 3.8–5.2)
RBC # FLD: 14.5 % — SIGNIFICANT CHANGE UP (ref 10.3–14.5)
SODIUM SERPL-SCNC: 138 MMOL/L — SIGNIFICANT CHANGE UP (ref 135–145)
TRIGL SERPL-MCNC: 97 MG/DL — SIGNIFICANT CHANGE UP (ref 10–149)
TROPONIN T SERPL-MCNC: < 0.06 NG/ML — SIGNIFICANT CHANGE UP (ref 0–0.06)
WBC # BLD: 6.31 K/UL — SIGNIFICANT CHANGE UP (ref 3.8–10.5)
WBC # FLD AUTO: 6.31 K/UL — SIGNIFICANT CHANGE UP (ref 3.8–10.5)

## 2018-05-17 PROCEDURE — 93018 CV STRESS TEST I&R ONLY: CPT | Mod: GC

## 2018-05-17 PROCEDURE — 93016 CV STRESS TEST SUPVJ ONLY: CPT | Mod: GC

## 2018-05-17 PROCEDURE — 93306 TTE W/DOPPLER COMPLETE: CPT | Mod: 26

## 2018-05-17 PROCEDURE — 78452 HT MUSCLE IMAGE SPECT MULT: CPT | Mod: 26

## 2018-05-17 RX ORDER — AMLODIPINE BESYLATE 2.5 MG/1
1 TABLET ORAL
Qty: 0 | Refills: 0 | COMMUNITY

## 2018-05-17 RX ORDER — QUETIAPINE FUMARATE 200 MG/1
50 TABLET, FILM COATED ORAL ONCE
Qty: 0 | Refills: 0 | Status: DISCONTINUED | OUTPATIENT
Start: 2018-05-17 | End: 2018-05-17

## 2018-05-17 RX ORDER — POTASSIUM CHLORIDE 20 MEQ
40 PACKET (EA) ORAL EVERY 4 HOURS
Qty: 0 | Refills: 0 | Status: COMPLETED | OUTPATIENT
Start: 2018-05-17 | End: 2018-05-17

## 2018-05-17 RX ORDER — VALSARTAN 80 MG/1
1 TABLET ORAL
Qty: 30 | Refills: 0
Start: 2018-05-17 | End: 2018-06-15

## 2018-05-17 RX ORDER — RIVAROXABAN 15 MG-20MG
1 KIT ORAL
Qty: 30 | Refills: 0 | OUTPATIENT
Start: 2018-05-17 | End: 2018-06-15

## 2018-05-17 RX ORDER — RIVAROXABAN 15 MG-20MG
1 KIT ORAL
Qty: 30 | Refills: 0
Start: 2018-05-17 | End: 2018-06-15

## 2018-05-17 RX ORDER — DILTIAZEM HCL 120 MG
1 CAPSULE, EXT RELEASE 24 HR ORAL
Qty: 30 | Refills: 0
Start: 2018-05-17 | End: 2018-06-15

## 2018-05-17 RX ADMIN — VALSARTAN 320 MILLIGRAM(S): 80 TABLET ORAL at 05:07

## 2018-05-17 RX ADMIN — Medication 40 MILLIEQUIVALENT(S): at 17:32

## 2018-05-17 RX ADMIN — Medication 25 MILLIGRAM(S): at 05:07

## 2018-05-17 RX ADMIN — Medication 400 MILLIGRAM(S): at 00:36

## 2018-05-17 RX ADMIN — Medication 50 MICROGRAM(S): at 05:07

## 2018-05-17 RX ADMIN — Medication 40 MILLIEQUIVALENT(S): at 14:08

## 2018-05-17 RX ADMIN — Medication 40 MILLIEQUIVALENT(S): at 02:00

## 2018-05-17 NOTE — DISCHARGE NOTE ADULT - CARE PROVIDER_API CALL
Allie Minor), Cardiac Electrophysiology; Cardiovascular Disease; Nuclear Cardiology  [address]  [address]  [place] [address]  Phone: [phone]  Fax: [phone]

## 2018-05-17 NOTE — CONSULT NOTE ADULT - SUBJECTIVE AND OBJECTIVE BOX
HISTORY OF PRESENT ILLNESS:  62 y/o F with PMH of HTN, Anxiety, HLD and hypothyroidism who has been undergoing  presented s/p ECT therapy today with new onset atrial fibrillation. As per the patient she had an ECT therapy today at [facility] and as soon as she had finished the ECT therapy she was told that she was in new onset atrial fibrillation and needed to come to the ER. Patient stated that she was in her usual state of health and had no complaints. Patient denied any CP, SOB, palpitations, fevers, chills, N/V/D/C, abdominal pain, dysuria, melena, hematochezia, recent travel, sick contact, pleuritic or positional chest pain.       PAST MEDICAL & SURGICAL HISTORY:  Hypothyroidism  HTN (hypertension)  Anxiety  HLD (hyperlipidemia)  S/P cholecystectomy: 6 yrs ago    	    MEDICATIONS:  diltiazem    Tablet 30 milliGRAM(s) Oral every 6 hours  enoxaparin Injectable 80 milliGRAM(s) SubCutaneous two times a day  hydrochlorothiazide 25 milliGRAM(s) Oral daily  valsartan 320 milliGRAM(s) Oral daily  QUEtiapine 50 milliGRAM(s) Oral at bedtime  atorvastatin 20 milliGRAM(s) Oral at bedtime  levothyroxine 50 MICROGram(s) Oral daily  potassium chloride    Tablet ER 40 milliEquivalent(s) Oral every 4 hours      Allergies  penicillin (Unknown)      FAMILY HISTORY:  Family history of prostate cancer  Family history of diabetes mellitus  Family history of hypertension  Family history of cancer (Mother): spindle cell CA      SOCIAL HISTORY:    [+ ] Non-smoker  [ ] Smoker  [- ] Alcohol      REVIEW OF SYSTEMS:  otherwise negative     PHYSICAL EXAM:  T(C): 36.7 (05-17-18 @ 04:05), Max: 36.7 (05-16-18 @ 18:08)  HR: 101 (05-17-18 @ 04:05) (80 - 147)  BP: 127/86 (05-17-18 @ 04:05) (107/72 - 164/112)  RR: 16 (05-17-18 @ 04:05) (16 - 20)  SpO2: 96% (05-17-18 @ 04:05) (96% - 99%)  Wt(kg): --  I&O's Summary      Appearance: Normal	  HEENT:   Normal oral mucosa, PERRL, EOMI	  Lymphatic: No lymphadenopathy  Cardiovascular: Normal S1 S2, No JVD, No murmurs, No edema  Respiratory: Lungs clear to auscultation	  Psychiatry: A & O x 3, Mood & affect appropriate  Gastrointestinal:  Soft, Non-tender, + BS	  Skin: No rashes, No ecchymoses, No cyanosis	  Neurologic: Non-focal  Extremities: Normal range of motion, No clubbing, cyanosis or edema  Vascular: Peripheral pulses palpable 2+ bilaterally    TELEMETRY:  AFIB  	    ECG:  	Afib with new non specific ST changes    RADIOLOGY:  < from: Xray Chest 1 View- PORTABLE-Urgent (05.16.18 @ 19:23) >  IMPRESSION:    Clear lungs.      	  LABS:	 	    CARDIAC MARKERS:  Troponin T, Serum: < 0.06  CKMB: 4.59 ng/mL (05-17 @ 00:30)                            13.4   6.31  )-----------( 229      ( 17 May 2018 06:08 )             38.9       05-17    138  |  103  |  7   ----------------------------<  89  3.2<L>   |  27  |  0.51    Ca    9.3      17 May 2018 06:08  Phos  2.9     05-17  Mg     2.2     05-17    TPro  7.9  /  Alb  4.6  /  TBili  0.5  /  DBili  x   /  AST  26  /  ALT  18  /  AlkPhos  32<L>  05-16        HgA1c: Hemoglobin A1C, Whole Blood: 5.6 % (05-17 @ 06:08)       Thyroid Stimulating Hormone, Serum: 1.89 uIU/mL (05-16 @ 18:35)        ASSESSMENT/PLAN: HISTORY OF PRESENT ILLNESS:  64 y/o F with PMH of HTN, Anxiety, HLD and hypothyroidism with no known h/o CAD/MI who has been undergoing  routine ECT therapy was admitted with asymptomatic  new onset atrial fibrillation. As per the patient after her ECT therapy 5/16 at [facility]  she was found to be in new onset atrial fibrillation and was sent to the ER. Patient stated that she was in her usual state of health and had no complaints. Patient denied any CP, SOB, palpitations, syncope or near syncope.       PAST MEDICAL & SURGICAL HISTORY:  Hypothyroidism  HTN (hypertension)  Anxiety  HLD (hyperlipidemia)  S/P cholecystectomy: 6 yrs ago    	    MEDICATIONS:  diltiazem    Tablet 30 milliGRAM(s) Oral every 6 hours  enoxaparin Injectable 80 milliGRAM(s) SubCutaneous two times a day  hydrochlorothiazide 25 milliGRAM(s) Oral daily  valsartan 320 milliGRAM(s) Oral daily  QUEtiapine 50 milliGRAM(s) Oral at bedtime  atorvastatin 20 milliGRAM(s) Oral at bedtime  levothyroxine 50 MICROGram(s) Oral daily  potassium chloride    Tablet ER 40 milliEquivalent(s) Oral every 4 hours      Allergies  penicillin (Unknown)      FAMILY HISTORY:  Family history of prostate cancer  Family history of diabetes mellitus  Family history of hypertension  Family history of cancer (Mother): spindle cell CA      SOCIAL HISTORY:    [+ ] Non-smoker  [ ] Smoker  [- ] Alcohol      REVIEW OF SYSTEMS:  otherwise negative     PHYSICAL EXAM:  T(C): 36.7 (05-17-18 @ 04:05), Max: 36.7 (05-16-18 @ 18:08)  HR: 101 (05-17-18 @ 04:05) (80 - 147)  BP: 127/86 (05-17-18 @ 04:05) (107/72 - 164/112)  RR: 16 (05-17-18 @ 04:05) (16 - 20)  SpO2: 96% (05-17-18 @ 04:05) (96% - 99%)  Wt(kg): --  I&O's Summary      Appearance: Normal	  HEENT:   Normal oral mucosa, PERRL, EOMI	  Lymphatic: No lymphadenopathy  Cardiovascular: Normal S1 S2, No JVD, No murmurs, No edema  Respiratory: Lungs clear to auscultation	  Psychiatry: A & O x 3, Mood & affect appropriate  Gastrointestinal:  Soft, Non-tender, + BS	  Skin: No rashes, No ecchymoses, No cyanosis	  Neurologic: Non-focal  Extremities: Normal range of motion, No clubbing, cyanosis or edema  Vascular: Peripheral pulses palpable 2+ bilaterally    TELEMETRY:  AFIB  	    ECG:  	Afib with incomp RBBB  new non specific ST changes    RADIOLOGY:  < from: Xray Chest 1 View- PORTABLE-Urgent (05.16.18 @ 19:23) >  IMPRESSION:    Clear lungs.      	  LABS:	 	    CARDIAC MARKERS:  Troponin T, Serum: < 0.06  CKMB: 4.59 ng/mL (05-17 @ 00:30)                            13.4   6.31  )-----------( 229      ( 17 May 2018 06:08 )             38.9       05-17    138  |  103  |  7   ----------------------------<  89  3.2<L>   |  27  |  0.51    Ca    9.3      17 May 2018 06:08  Phos  2.9     05-17  Mg     2.2     05-17    TPro  7.9  /  Alb  4.6  /  TBili  0.5  /  DBili  x   /  AST  26  /  ALT  18  /  AlkPhos  32<L>  05-16        HgA1c: Hemoglobin A1C, Whole Blood: 5.6 % (05-17 @ 06:08)       Thyroid Stimulating Hormone, Serum: 1.89 uIU/mL (05-16 @ 18:35)    TTE/NST: pending     ASSESSMENT/PLAN: 	 64 y/o F with PMH of HTN, Anxiety, HLD and hypothyroidism with no known h/o CAD/MI who has been undergoing  routine ECT therapy was admitted with asymptomatic  new onset atrial fibrillation. As per the patient after her ECT therapy 5/16 at [facility]  she was found to be in new onset atrial fibrillation and was sent to the ER. Patient stated that she was in her usual state of health and had no complaints. Patient denied any CP, SOB, palpitations, syncope or near syncope.       -- The patient has ruled out for acute coronary syndrome with negative serial cardiac enzymes  -- check TTE/NST to r/o structural heart disease /CAD  -- continue on tele  -- TSH WNL  -- c/w cardizem/hctz/ valsartan/ statin   -- EP appreciated - c/w Cardizem and Lovenox - will need NOAC upon discharge as her CHADS-VASC is already at least 2                              - unlikely to need JUANITA-DCCV as she's completely asymptomatic   -- IF NST abnormal would transition Lovenox to Hep gtt   -- HD stable No CHF  -- final recs pending above       Rylie Davis RPA-C HISTORY OF PRESENT ILLNESS:  64 y/o F with PMH of HTN, Anxiety, HLD and hypothyroidism with no known h/o CAD/MI who has been undergoing  routine ECT therapy was admitted with asymptomatic  new onset atrial fibrillation. As per the patient after her ECT therapy 5/16 at [facility]  she was found to be in new onset atrial fibrillation and was sent to the ER. Patient stated that she was in her usual state of health and had no complaints. Patient denied any CP, SOB, palpitations, syncope or near syncope.       PAST MEDICAL & SURGICAL HISTORY:  Hypothyroidism  HTN (hypertension)  Anxiety  HLD (hyperlipidemia)  S/P cholecystectomy: 6 yrs ago    	    MEDICATIONS:  diltiazem    Tablet 30 milliGRAM(s) Oral every 6 hours  enoxaparin Injectable 80 milliGRAM(s) SubCutaneous two times a day  hydrochlorothiazide 25 milliGRAM(s) Oral daily  valsartan 320 milliGRAM(s) Oral daily  QUEtiapine 50 milliGRAM(s) Oral at bedtime  atorvastatin 20 milliGRAM(s) Oral at bedtime  levothyroxine 50 MICROGram(s) Oral daily  potassium chloride    Tablet ER 40 milliEquivalent(s) Oral every 4 hours      Allergies  penicillin (Unknown)      FAMILY HISTORY:  Family history of prostate cancer  Family history of diabetes mellitus  Family history of hypertension  Family history of cancer (Mother): spindle cell CA      SOCIAL HISTORY:    [+ ] Non-smoker  [ ] Smoker  [- ] Alcohol      REVIEW OF SYSTEMS:  otherwise negative     PHYSICAL EXAM:  T(C): 36.7 (05-17-18 @ 04:05), Max: 36.7 (05-16-18 @ 18:08)  HR: 101 (05-17-18 @ 04:05) (80 - 147)  BP: 127/86 (05-17-18 @ 04:05) (107/72 - 164/112)  RR: 16 (05-17-18 @ 04:05) (16 - 20)  SpO2: 96% (05-17-18 @ 04:05) (96% - 99%)  Wt(kg): --  I&O's Summary      Appearance: Normal	  HEENT:   Normal oral mucosa, PERRL, EOMI	  Lymphatic: No lymphadenopathy  Cardiovascular: Normal S1 S2, No JVD, No murmurs, No edema  Respiratory: Lungs clear to auscultation	  Psychiatry: A & O x 3, Mood & affect appropriate  Gastrointestinal:  Soft, Non-tender, + BS	  Skin: No rashes, No ecchymoses, No cyanosis	  Neurologic: Non-focal  Extremities: Normal range of motion, No clubbing, cyanosis or edema  Vascular: Peripheral pulses palpable 2+ bilaterally    TELEMETRY:  AFIB  	    ECG:  	Afib with incomp RBBB  new non specific ST changes    RADIOLOGY:  < from: Xray Chest 1 View- PORTABLE-Urgent (05.16.18 @ 19:23) >  IMPRESSION:    Clear lungs.      	  LABS:	 	    CARDIAC MARKERS:  Troponin T, Serum: < 0.06  CKMB: 4.59 ng/mL (05-17 @ 00:30)                            13.4   6.31  )-----------( 229      ( 17 May 2018 06:08 )             38.9       05-17    138  |  103  |  7   ----------------------------<  89  3.2<L>   |  27  |  0.51    Ca    9.3      17 May 2018 06:08  Phos  2.9     05-17  Mg     2.2     05-17    TPro  7.9  /  Alb  4.6  /  TBili  0.5  /  DBili  x   /  AST  26  /  ALT  18  /  AlkPhos  32<L>  05-16        HgA1c: Hemoglobin A1C, Whole Blood: 5.6 % (05-17 @ 06:08)       Thyroid Stimulating Hormone, Serum: 1.89 uIU/mL (05-16 @ 18:35)    TTE/NST: pending     ASSESSMENT/PLAN: 	 64 y/o F with PMH of HTN, Anxiety, HLD and hypothyroidism with no known h/o CAD/MI who has been undergoing  routine ECT therapy was admitted with asymptomatic  new onset atrial fibrillation. As per the patient after her ECT therapy 5/16 at [facility]  she was found to be in new onset atrial fibrillation and was sent to the ER. Patient stated that she was in her usual state of health and had no complaints. Patient denied any CP, SOB, palpitations, syncope or near syncope.       -- The patient has ruled out for acute coronary syndrome with negative serial cardiac enzymes  -- check TTE/NST to r/o structural heart disease /CAD  -- continue on tele  -- TSH WNL  -- K was 2.9 on admit - being repleted - currently 3.2 - is on HCTZ and Valsartan at home   -- c/w cardizem/hctz/ valsartan/ statin   -- EP appreciated - c/w Cardizem and Lovenox - will need NOAC upon discharge as her CHADS-VASC is already at least 2                              - unlikely to need JUANITA-DCCV as she's completely asymptomatic   -- IF NST abnormal would transition Lovenox to Hep gtt   -- HD stable No CHF  -- final recs pending above       Rylie Davis RPA-C HISTORY OF PRESENT ILLNESS:  64 y/o F with PMH of HTN, Anxiety, HLD and hypothyroidism with no known h/o CAD/MI who has been undergoing  routine ECT therapy was admitted with asymptomatic  new onset atrial fibrillation. As per the patient after her ECT therapy 5/16 at [facility]  she was found to be in new onset atrial fibrillation and was sent to the ER. Patient stated that she was in her usual state of health and had no complaints. Patient denied any CP, SOB, palpitations, syncope or near syncope.       PAST MEDICAL & SURGICAL HISTORY:  Hypothyroidism  HTN (hypertension)  Anxiety  HLD (hyperlipidemia)  S/P cholecystectomy: 6 yrs ago    	    MEDICATIONS:  diltiazem    Tablet 30 milliGRAM(s) Oral every 6 hours  enoxaparin Injectable 80 milliGRAM(s) SubCutaneous two times a day  hydrochlorothiazide 25 milliGRAM(s) Oral daily  valsartan 320 milliGRAM(s) Oral daily  QUEtiapine 50 milliGRAM(s) Oral at bedtime  atorvastatin 20 milliGRAM(s) Oral at bedtime  levothyroxine 50 MICROGram(s) Oral daily  potassium chloride    Tablet ER 40 milliEquivalent(s) Oral every 4 hours      Allergies  penicillin (Unknown)      FAMILY HISTORY:  Family history of prostate cancer  Family history of diabetes mellitus  Family history of hypertension  Family history of cancer (Mother): spindle cell CA      SOCIAL HISTORY:    [+ ] Non-smoker  [ ] Smoker  [- ] Alcohol      REVIEW OF SYSTEMS:  otherwise negative     PHYSICAL EXAM:  T(C): 36.7 (05-17-18 @ 04:05), Max: 36.7 (05-16-18 @ 18:08)  HR: 101 (05-17-18 @ 04:05) (80 - 147)  BP: 127/86 (05-17-18 @ 04:05) (107/72 - 164/112)  RR: 16 (05-17-18 @ 04:05) (16 - 20)  SpO2: 96% (05-17-18 @ 04:05) (96% - 99%)  Wt(kg): --  I&O's Summary      Appearance: Normal	  HEENT:   Normal oral mucosa, PERRL, EOMI	  Lymphatic: No lymphadenopathy  Cardiovascular: Normal S1 S2, No JVD, No murmurs, No edema  Respiratory: Lungs clear to auscultation	  Psychiatry: A & O x 3, Mood & affect appropriate  Gastrointestinal:  Soft, Non-tender, + BS	  Skin: No rashes, No ecchymoses, No cyanosis	  Neurologic: Non-focal  Extremities: Normal range of motion, No clubbing, cyanosis or edema  Vascular: Peripheral pulses palpable 2+ bilaterally    TELEMETRY:  AFIB  	    ECG:  	Afib with incomp RBBB  new non specific ST changes    RADIOLOGY:  < from: Xray Chest 1 View- PORTABLE-Urgent (05.16.18 @ 19:23) >  IMPRESSION:    Clear lungs.      	  LABS:	 	    CARDIAC MARKERS:  Troponin T, Serum: < 0.06  CKMB: 4.59 ng/mL (05-17 @ 00:30)                            13.4   6.31  )-----------( 229      ( 17 May 2018 06:08 )             38.9       05-17    138  |  103  |  7   ----------------------------<  89  3.2<L>   |  27  |  0.51    Ca    9.3      17 May 2018 06:08  Phos  2.9     05-17  Mg     2.2     05-17    TPro  7.9  /  Alb  4.6  /  TBili  0.5  /  DBili  x   /  AST  26  /  ALT  18  /  AlkPhos  32<L>  05-16        HgA1c: Hemoglobin A1C, Whole Blood: 5.6 % (05-17 @ 06:08)       Thyroid Stimulating Hormone, Serum: 1.89 uIU/mL (05-16 @ 18:35)    TTE/NST: pending     ASSESSMENT/PLAN: 	 64 y/o F with PMH of HTN, Anxiety, HLD and hypothyroidism with no known h/o CAD/MI who has been undergoing  routine ECT therapy was admitted with asymptomatic  new onset atrial fibrillation. As per the patient after her ECT therapy 5/16 at [facility]  she was found to be in new onset atrial fibrillation and was sent to the ER. Patient stated that she was in her usual state of health and had no complaints. Patient denied any CP, SOB, palpitations, syncope or near syncope.       -- The patient has ruled out for acute coronary syndrome with negative serial cardiac enzymes  -- check TTE/NST to r/o structural heart disease /CAD  -- continue on tele  -- TSH WNL  -- K was 2.9 on admit - being repleted - currently 3.2 - is on HCTZ and Valsartan at home   -- c/w cardizem/hctz/ valsartan/ statin   -- EP appreciated - c/w Cardizem and Lovenox - will need NOAC upon discharge as her CHADS-VASC is already at least 2                              - unlikely to need JUANITA-DCCV as she's completely asymptomatic   -- IF NST abnormal would transition Lovenox to Hep gtt   -- HD stable No CHF  -- f/u with her cardiologist upon DC Dr Douglas Goldberg  -- final recs pending above       Rylie Davis RPA-C

## 2018-05-17 NOTE — CHART NOTE - NSCHARTNOTEFT_GEN_A_CORE
TTE/NST reviewed- Normal LV function with no ischemia on stress test.  She has converted to NSR spontaneously.   Will dc HCTZ 2/2 hypokalemia  C/w Valsartan 320mg and Cardizem CD 120mg and Xarelto 20mg daily  will follow up with Dr Larson for cardio as patient found out her cardiologist no longer takes her insurance Monday 5/21 @ 145pm   d/w Dr Jung and PA   d/w patient and family at bedside     < from: TTE with Doppler (w/Cont) (05.17.18 @ 11:34) >    CONCLUSIONS:  1. Mitral annular calcification, otherwise normal mitral  valve. Minimal mitral regurgitation.  2. Aortic valve leaflet morphology not well visualized.  Mild aortic regurgitation.  3. Normal left ventricular internal dimensions and wall  thicknesses.  4. Endocardium not well visualized; grossly normal left  ventricular systolic function.  Endocardial visualization  enhanced with intravenous injection of echo contrast  (Definity).  5.Unable to accurately evaluate right ventricular size or  systolic function.    < end of copied text >    < from: Nuclear Stress Test-Pharmacologic (05.17.18 @ 13:19) >    IMPRESSIONS:Normal Study  * Myocardial Perfusion SPECT results are normal.  * Review of raw data shows: The study is of good technical  quality.  * The left ventricle was normal in size. Normal myocardial  perfusion scan,with no evidence of infarction or inducible  ischemia.  * Post-stress gated wall motion analysis was performed  (LVEF = 67 %;LVEDV = 70 ml.), revealing normal LV systolic  function. RV function appeared normal.  * NOTE: The LV time activity curve suggested diastolic  dysfunction.    < end of copied text >

## 2018-05-17 NOTE — DISCHARGE NOTE ADULT - MEDICATION SUMMARY - MEDICATIONS TO STOP TAKING
I will STOP taking the medications listed below when I get home from the hospital:    valsartan 320 mg oral tablet  -- 1 tab(s) by mouth once a day-HTN    amLODIPine 10 mg oral tablet  -- 1 tab(s) by mouth once a day I will STOP taking the medications listed below when I get home from the hospital:    amLODIPine 10 mg oral tablet  -- 1 tab(s) by mouth once a day    valsartan-hydrochlorothiazide 320mg-25mg oral tablet  -- 1 tab(s) by mouth once a day

## 2018-05-17 NOTE — DISCHARGE NOTE ADULT - PLAN OF CARE
rate control and anticoagulation Please take your medications as prescribed.  Continue to take your blood thinner as prescribed and follow with your physician to monitor your levels.  Low fat diet, reduce caffeine intake. Low sodium and fat diet, continue anti-hypertensive medications, and follow up with primary care physician. Low fat diet, exercise daily and continue current medications. Follow up with primary care physician and cardiologist for management. continue synthroid check TFTs in 4-6 weeks follow up with your Psychiatrist

## 2018-05-17 NOTE — DISCHARGE NOTE ADULT - MEDICATION SUMMARY - MEDICATIONS TO TAKE
I will START or STAY ON the medications listed below when I get home from the hospital:    Cardizem  mg/24 hours oral capsule, extended release  -- 1 cap(s) by mouth once a day   -- It is very important that you take or use this exactly as directed.  Do not skip doses or discontinue unless directed by your doctor.  Some non-prescription drugs may aggravate your condition.  Read all labels carefully.  If a warning appears, check with your doctor before taking.  Swallow whole.  Do not crush.    -- Indication: For Atrial fibrillation    Xarelto 20 mg oral tablet  -- 1 tab(s) by mouth once a day (in the evening)   Please check if patient requires Prior auth or has a copay.Call [place] [id number]  -- Check with your doctor before becoming pregnant.  It is very important that you take or use this exactly as directed.  Do not skip doses or discontinue unless directed by your doctor.  Obtain medical advice before taking any non-prescription drugs as some may affect the action of this medication.  Take with food.    -- Indication: For Atrial fibrillation    atorvastatin 20 mg oral tablet  -- 1 tab(s) by mouth once a day (at bedtime)-Lipid  -- Indication: For HLD (hyperlipidemia)    valsartan-hydrochlorothiazide 320mg-25mg oral tablet  -- 1 tab(s) by mouth once a day  -- Indication: For Essential hypertension    QUEtiapine 50 mg oral tablet  -- 1 tab(s) by mouth once a day (at bedtime)-Mood  -- Indication: For depression     levothyroxine 50 mcg (0.05 mg) oral tablet  -- 1 tab(s) by mouth once a day-Hypo-Thyroid  -- Indication: For Hypothyroidism I will START or STAY ON the medications listed below when I get home from the hospital:    valsartan 320 mg oral tablet  -- 1 tab(s) by mouth once a day  -- Indication: For Essential hypertension    Cardizem  mg/24 hours oral capsule, extended release  -- 1 cap(s) by mouth once a day   -- It is very important that you take or use this exactly as directed.  Do not skip doses or discontinue unless directed by your doctor.  Some non-prescription drugs may aggravate your condition.  Read all labels carefully.  If a warning appears, check with your doctor before taking.  Swallow whole.  Do not crush.    -- Indication: For Atrial fibrillation    Xarelto 20 mg oral tablet  -- 1 tab(s) by mouth once a day (in the evening)   Please check if patient requires Prior auth or has a copay.Call [place] [id number]  -- Check with your doctor before becoming pregnant.  It is very important that you take or use this exactly as directed.  Do not skip doses or discontinue unless directed by your doctor.  Obtain medical advice before taking any non-prescription drugs as some may affect the action of this medication.  Take with food.    -- Indication: For Atrial fibrillation    atorvastatin 20 mg oral tablet  -- 1 tab(s) by mouth once a day (at bedtime)-Lipid  -- Indication: For HLD (hyperlipidemia)    QUEtiapine 50 mg oral tablet  -- 1 tab(s) by mouth once a day (at bedtime)-Mood  -- Indication: For depression     levothyroxine 50 mcg (0.05 mg) oral tablet  -- 1 tab(s) by mouth once a day-Hypo-Thyroid  -- Indication: For Hypothyroidism

## 2018-05-17 NOTE — DISCHARGE NOTE ADULT - PATIENT PORTAL LINK FT
You can access the [email or web address], offered by [facility], by registering with the following website: [email or web address]

## 2018-05-17 NOTE — CONSULT NOTE ADULT - SUBJECTIVE AND OBJECTIVE BOX
EP ATTENDING      HISTORY OF PRESENT ILLNESS: She is a pleasant 62 y/o female PMH HTN now admitted with newly diagnosed atrial fibrillation. She is largely asymptomatic, and it is unclear how long she has been in AF considering this was incidentally discovered at ECT therapy. She denies palpitations, syncope, nor angina. CHADS-VASc is already 2, almost 3.    PAST MEDICAL & SURGICAL HISTORY:  Hypothyroidism  HTN (hypertension)  Anxiety  HLD (hyperlipidemia)  atrial fibrillation    S/P cholecystectomy: 6 yrs ago    MEDICATIONS  (STANDING):  atorvastatin 20 milliGRAM(s) Oral at bedtime  diltiazem    Tablet 30 milliGRAM(s) Oral every 6 hours  enoxaparin Injectable 80 milliGRAM(s) SubCutaneous two times a day  hydrochlorothiazide 25 milliGRAM(s) Oral daily  levothyroxine 50 MICROGram(s) Oral daily  QUEtiapine 50 milliGRAM(s) Oral at bedtime  valsartan 320 milliGRAM(s) Oral daily      Allergies  penicillin (Unknown)    FAMILY HISTORY:  Family history of prostate cancer  Family history of diabetes mellitus  Family history of hypertension  Family history of cancer (Mother): spindle cell CA    Non-contributary for premature coronary disease or sudden cardiac death    SOCIAL HISTORY:    [x ] Non-smoker  [ ] Smoker  [ ] Alcohol      REVIEW OF SYSTEMS:  [ ]chest pain  [  ]shortness of breath  [  ]palpitations  [  ]syncope  [ ]near syncope [ ]upper extremity weakness   [ ] lower extremity weakness  [  ]diplopia  [  ]altered mental status   [  ]fevers  [ ]chills [ ]nausea  [ ]vomitting  [  ]dysphagia    [ ]abdominal pain  [ ]melena  [ ]BRBPR    [  ]epistaxis  [  ]rash    [ ]lower extremity edema        [x ] All others negative	  [ ] Unable to obtain    PHYSICAL EXAM:  T(C): 36.7 (05-17-18 @ 04:05), Max: 36.7 (05-16-18 @ 18:08)  HR: 101 (05-17-18 @ 04:05) (80 - 147)  BP: 127/86 (05-17-18 @ 04:05) (107/72 - 164/112)  RR: 16 (05-17-18 @ 04:05) (16 - 20)  SpO2: 96% (05-17-18 @ 04:05) (96% - 99%)  Wt(kg): --    no JVD  tachy, irregular, no murmurs  CTAB  soft nt/nd  no c/c/e      TELEMETRY: 	  AF  ECG:  	AF, incomplete RBBB    Echo: pending  NST: pending  Cath: n/a  	  	  LABS:	 	                          13.8   7.47  )-----------( 231      ( 16 May 2018 18:35 )             40.3     05-16    141  |  101  |  9   ----------------------------<  124<H>  2.9<LL>   |  28  |  0.62    Ca    9.2      16 May 2018 18:35  Mg     1.9     05-17    TPro  7.9  /  Alb  4.6  /  TBili  0.5  /  DBili  x   /  AST  26  /  ALT  18  /  AlkPhos  32<L>  05-16    proBNP:   Lipid Profile:   HgA1c:   TSH: Thyroid Stimulating Hormone, Serum: 1.89 uIU/mL (05-16 @ 18:35)  Thyroid Stimulating Hormone, Serum: 1.86 uIU/mL (05-16 @ 18:35)      A/P) She is a pleasant 62 y/o female PMH HTN now admitted with newly diagnosed atrial fibrillation. She is largely asymptomatic, and it is unclear how long she has been in AF considering this was incidentally discovered at ECT therapy. She denies palpitations, syncope, nor angina. CHADS-VASc is already 2, almost 3.    -continue cardizem  -continue lovenox, recommend lifelong anticoagulation with NOAC upon discharge  -get echo r/o structural heart disease  -get NST given baseline non-specific ST changes  -final EP reccs pending echo and NST, but I will likely recommend JUANITA-DCCV prior to discharge once the above workup is complete      Allie Minor M.D., [facility]  Cardiac Electrophysiology  [facility]  [address] [place] [address], [address] [place] [address]  [email or web address]    office [phone]  pager [phone] EP ATTENDING      HISTORY OF PRESENT ILLNESS: She is a pleasant 64 y/o female PMH HTN now admitted with newly diagnosed atrial fibrillation. She is largely asymptomatic, and it is unclear how long she has been in AF considering this was incidentally discovered at ECT therapy. She denies palpitations, syncope, nor angina. CHADS-VASc is already 2, almost 3.    PAST MEDICAL & SURGICAL HISTORY:  Hypothyroidism  HTN (hypertension)  Anxiety  HLD (hyperlipidemia)  atrial fibrillation    S/P cholecystectomy: 6 yrs ago    MEDICATIONS  (STANDING):  atorvastatin 20 milliGRAM(s) Oral at bedtime  diltiazem    Tablet 30 milliGRAM(s) Oral every 6 hours  enoxaparin Injectable 80 milliGRAM(s) SubCutaneous two times a day  hydrochlorothiazide 25 milliGRAM(s) Oral daily  levothyroxine 50 MICROGram(s) Oral daily  QUEtiapine 50 milliGRAM(s) Oral at bedtime  valsartan 320 milliGRAM(s) Oral daily      Allergies  penicillin (Unknown)    FAMILY HISTORY:  Family history of prostate cancer  Family history of diabetes mellitus  Family history of hypertension  Family history of cancer (Mother): spindle cell CA    Non-contributary for premature coronary disease or sudden cardiac death    SOCIAL HISTORY:    [x ] Non-smoker  [ ] Smoker  [ ] Alcohol      REVIEW OF SYSTEMS:  [ ]chest pain  [  ]shortness of breath  [  ]palpitations  [  ]syncope  [ ]near syncope [ ]upper extremity weakness   [ ] lower extremity weakness  [  ]diplopia  [  ]altered mental status   [  ]fevers  [ ]chills [ ]nausea  [ ]vomitting  [  ]dysphagia    [ ]abdominal pain  [ ]melena  [ ]BRBPR    [  ]epistaxis  [  ]rash    [ ]lower extremity edema        [x ] All others negative	  [ ] Unable to obtain    PHYSICAL EXAM:  T(C): 36.7 (05-17-18 @ 04:05), Max: 36.7 (05-16-18 @ 18:08)  HR: 101 (05-17-18 @ 04:05) (80 - 147)  BP: 127/86 (05-17-18 @ 04:05) (107/72 - 164/112)  RR: 16 (05-17-18 @ 04:05) (16 - 20)  SpO2: 96% (05-17-18 @ 04:05) (96% - 99%)  Wt(kg): --    no JVD  tachy, irregular, no murmurs  CTAB  soft nt/nd  no c/c/e      TELEMETRY: 	  AF  ECG:  	AF, incomplete RBBB    Echo: pending  NST: pending  Cath: n/a  	  	  LABS:	 	                          13.8   7.47  )-----------( 231      ( 16 May 2018 18:35 )             40.3     05-16    141  |  101  |  9   ----------------------------<  124<H>  2.9<LL>   |  28  |  0.62    Ca    9.2      16 May 2018 18:35  Mg     1.9     05-17    TPro  7.9  /  Alb  4.6  /  TBili  0.5  /  DBili  x   /  AST  26  /  ALT  18  /  AlkPhos  32<L>  05-16    proBNP:   Lipid Profile:   HgA1c:   TSH: Thyroid Stimulating Hormone, Serum: 1.89 uIU/mL (05-16 @ 18:35)  Thyroid Stimulating Hormone, Serum: 1.86 uIU/mL (05-16 @ 18:35)      A/P) She is a pleasant 64 y/o female PMH HTN now admitted with newly diagnosed atrial fibrillation. She is largely asymptomatic, and it is unclear how long she has been in AF considering this was incidentally discovered at ECT therapy. She denies palpitations, syncope, nor angina. CHADS-VASc is already 2, almost 3.    -continue cardizem  -continue lovenox, recommend lifelong anticoagulation with NOAC upon discharge  -get echo r/o structural heart disease  -get NST given baseline non-specific ST changes  -final EP reccs pending echo and NST, but unlikely to need JUANITA-DCCV as she's completely asymptomatic      Allie Minor M.D., [facility]  Cardiac Electrophysiology  [facility]  [address] [place] [address], [address] [place] [address]  [email or web address]    office [phone]  pager [phone]

## 2018-05-17 NOTE — DISCHARGE NOTE ADULT - CARE PLAN
Principal Discharge DX:	Atrial fibrillation with RVR  Goal:	rate control and anticoagulation  Assessment and plan of treatment:	Please take your medications as prescribed.  Continue to take your blood thinner as prescribed and follow with your physician to monitor your levels.  Low fat diet, reduce caffeine intake.  Secondary Diagnosis:	Essential hypertension  Assessment and plan of treatment:	Low sodium and fat diet, continue anti-hypertensive medications, and follow up with primary care physician.  Secondary Diagnosis:	HLD (hyperlipidemia)  Assessment and plan of treatment:	Low fat diet, exercise daily and continue current medications. Follow up with primary care physician and cardiologist for management.  Secondary Diagnosis:	Hypothyroidism  Assessment and plan of treatment:	continue synthroid check TFTs in 4-6 weeks  Secondary Diagnosis:	Depression  Assessment and plan of treatment:	follow up with your Psychiatrist

## 2018-05-18 LAB
BACTERIA UR CULT: SIGNIFICANT CHANGE UP
SPECIMEN SOURCE: SIGNIFICANT CHANGE UP

## 2018-05-30 PROCEDURE — 90870 ELECTROCONVULSIVE THERAPY: CPT

## 2018-05-30 RX ORDER — MIDAZOLAM HYDROCHLORIDE 1 MG/ML
2 INJECTION, SOLUTION INTRAMUSCULAR; INTRAVENOUS ONCE
Qty: 0 | Refills: 0 | Status: DISCONTINUED | OUTPATIENT
Start: 2018-05-30 | End: 2018-05-30

## 2018-05-30 RX ADMIN — MIDAZOLAM HYDROCHLORIDE 2 MILLIGRAM(S): 1 INJECTION, SOLUTION INTRAMUSCULAR; INTRAVENOUS at 17:33

## 2018-06-05 PROCEDURE — 90870 ELECTROCONVULSIVE THERAPY: CPT

## 2018-06-12 PROCEDURE — 90870 ELECTROCONVULSIVE THERAPY: CPT

## 2018-06-26 PROCEDURE — 90870 ELECTROCONVULSIVE THERAPY: CPT

## 2018-07-10 PROCEDURE — 90870 ELECTROCONVULSIVE THERAPY: CPT

## 2018-08-09 PROCEDURE — 90870 ELECTROCONVULSIVE THERAPY: CPT

## 2018-08-27 PROCEDURE — 90870 ELECTROCONVULSIVE THERAPY: CPT

## 2018-09-20 PROCEDURE — 90870 ELECTROCONVULSIVE THERAPY: CPT

## 2018-09-25 PROCEDURE — 90870 ELECTROCONVULSIVE THERAPY: CPT

## 2018-09-27 PROCEDURE — 90870 ELECTROCONVULSIVE THERAPY: CPT

## 2018-10-02 PROCEDURE — 90870 ELECTROCONVULSIVE THERAPY: CPT

## 2018-10-04 PROCEDURE — 90870 ELECTROCONVULSIVE THERAPY: CPT

## 2018-10-11 PROCEDURE — 90870 ELECTROCONVULSIVE THERAPY: CPT

## 2018-10-19 PROCEDURE — 90870 ELECTROCONVULSIVE THERAPY: CPT

## 2018-10-25 PROCEDURE — 90870 ELECTROCONVULSIVE THERAPY: CPT

## 2018-11-08 PROCEDURE — 90870 ELECTROCONVULSIVE THERAPY: CPT

## 2018-12-13 PROCEDURE — 90870 ELECTROCONVULSIVE THERAPY: CPT

## 2018-12-27 PROCEDURE — 90870 ELECTROCONVULSIVE THERAPY: CPT

## 2019-01-11 PROCEDURE — 90870 ELECTROCONVULSIVE THERAPY: CPT

## 2019-01-14 PROCEDURE — 90870 ELECTROCONVULSIVE THERAPY: CPT

## 2019-01-24 PROCEDURE — 90870 ELECTROCONVULSIVE THERAPY: CPT

## 2019-01-31 PROCEDURE — 90870 ELECTROCONVULSIVE THERAPY: CPT

## 2019-02-14 PROCEDURE — 90870 ELECTROCONVULSIVE THERAPY: CPT

## 2019-03-14 PROCEDURE — 90870 ELECTROCONVULSIVE THERAPY: CPT

## 2019-03-28 PROCEDURE — 99213 OFFICE O/P EST LOW 20 MIN: CPT

## 2019-03-28 PROCEDURE — 90870 ELECTROCONVULSIVE THERAPY: CPT

## 2019-05-02 PROCEDURE — 90870 ELECTROCONVULSIVE THERAPY: CPT

## 2019-06-20 PROCEDURE — 90870 ELECTROCONVULSIVE THERAPY: CPT

## 2019-06-24 ENCOUNTER — EMERGENCY (EMERGENCY)
Facility: HOSPITAL | Age: 64
LOS: 1 days | Discharge: HOME | End: 2019-06-24
Attending: EMERGENCY MEDICINE
Payer: MEDICARE

## 2019-06-24 VITALS
OXYGEN SATURATION: 98 % | SYSTOLIC BLOOD PRESSURE: 167 MMHG | HEART RATE: 98 BPM | TEMPERATURE: 99 F | DIASTOLIC BLOOD PRESSURE: 74 MMHG | RESPIRATION RATE: 18 BRPM

## 2019-06-24 VITALS
SYSTOLIC BLOOD PRESSURE: 164 MMHG | RESPIRATION RATE: 16 BRPM | HEART RATE: 64 BPM | TEMPERATURE: 98 F | OXYGEN SATURATION: 97 % | WEIGHT: 134.92 LBS | DIASTOLIC BLOOD PRESSURE: 93 MMHG | HEIGHT: 69 IN

## 2019-06-24 DIAGNOSIS — Z90.49 ACQUIRED ABSENCE OF OTHER SPECIFIED PARTS OF DIGESTIVE TRACT: Chronic | ICD-10-CM

## 2019-06-24 PROBLEM — E03.9 HYPOTHYROIDISM, UNSPECIFIED: Chronic | Status: ACTIVE | Noted: 2018-05-16

## 2019-06-24 LAB
ALBUMIN SERPL ELPH-MCNC: 4.4 G/DL — SIGNIFICANT CHANGE UP (ref 3.3–5)
ALP SERPL-CCNC: 35 U/L — LOW (ref 40–120)
ALT FLD-CCNC: 23 U/L — SIGNIFICANT CHANGE UP (ref 10–45)
ANION GAP SERPL CALC-SCNC: 13 MMOL/L — SIGNIFICANT CHANGE UP (ref 5–17)
APTT BLD: 23.3 SEC — LOW (ref 27.5–36.3)
AST SERPL-CCNC: 28 U/L — SIGNIFICANT CHANGE UP (ref 10–40)
BASOPHILS # BLD AUTO: 0 K/UL — SIGNIFICANT CHANGE UP (ref 0–0.2)
BASOPHILS NFR BLD AUTO: 0.1 % — SIGNIFICANT CHANGE UP (ref 0–2)
BILIRUB SERPL-MCNC: 0.6 MG/DL — SIGNIFICANT CHANGE UP (ref 0.2–1.2)
BLD GP AB SCN SERPL QL: NEGATIVE — SIGNIFICANT CHANGE UP
BUN SERPL-MCNC: 19 MG/DL — SIGNIFICANT CHANGE UP (ref 7–23)
CALCIUM SERPL-MCNC: 9.3 MG/DL — SIGNIFICANT CHANGE UP (ref 8.4–10.5)
CHLORIDE SERPL-SCNC: 85 MMOL/L — LOW (ref 96–108)
CO2 SERPL-SCNC: 26 MMOL/L — SIGNIFICANT CHANGE UP (ref 22–31)
CREAT SERPL-MCNC: 0.66 MG/DL — SIGNIFICANT CHANGE UP (ref 0.5–1.3)
EOSINOPHIL # BLD AUTO: 0 K/UL — SIGNIFICANT CHANGE UP (ref 0–0.5)
EOSINOPHIL NFR BLD AUTO: 0.6 % — SIGNIFICANT CHANGE UP (ref 0–6)
GLUCOSE SERPL-MCNC: 150 MG/DL — HIGH (ref 70–99)
HCT VFR BLD CALC: 36.8 % — SIGNIFICANT CHANGE UP (ref 34.5–45)
HGB BLD-MCNC: 13.5 G/DL — SIGNIFICANT CHANGE UP (ref 11.5–15.5)
INR BLD: 1.01 RATIO — SIGNIFICANT CHANGE UP (ref 0.88–1.16)
LYMPHOCYTES # BLD AUTO: 0.8 K/UL — LOW (ref 1–3.3)
LYMPHOCYTES # BLD AUTO: 11.8 % — LOW (ref 13–44)
MCHC RBC-ENTMCNC: 36.7 GM/DL — HIGH (ref 32–36)
MCHC RBC-ENTMCNC: 37.3 PG — HIGH (ref 27–34)
MCV RBC AUTO: 102 FL — HIGH (ref 80–100)
MONOCYTES # BLD AUTO: 0.5 K/UL — SIGNIFICANT CHANGE UP (ref 0–0.9)
MONOCYTES NFR BLD AUTO: 8.1 % — SIGNIFICANT CHANGE UP (ref 2–14)
NEUTROPHILS # BLD AUTO: 5.3 K/UL — SIGNIFICANT CHANGE UP (ref 1.8–7.4)
NEUTROPHILS NFR BLD AUTO: 79.3 % — HIGH (ref 43–77)
PLATELET # BLD AUTO: 234 K/UL — SIGNIFICANT CHANGE UP (ref 150–400)
POTASSIUM SERPL-MCNC: 3.8 MMOL/L — SIGNIFICANT CHANGE UP (ref 3.5–5.3)
POTASSIUM SERPL-SCNC: 3.8 MMOL/L — SIGNIFICANT CHANGE UP (ref 3.5–5.3)
PROT SERPL-MCNC: 7.3 G/DL — SIGNIFICANT CHANGE UP (ref 6–8.3)
PROTHROM AB SERPL-ACNC: 11.6 SEC — SIGNIFICANT CHANGE UP (ref 10–12.9)
RBC # BLD: 3.62 M/UL — LOW (ref 3.8–5.2)
RBC # FLD: 13.7 % — SIGNIFICANT CHANGE UP (ref 10.3–14.5)
RH IG SCN BLD-IMP: POSITIVE — SIGNIFICANT CHANGE UP
SODIUM SERPL-SCNC: 124 MMOL/L — LOW (ref 135–145)
WBC # BLD: 6.7 K/UL — SIGNIFICANT CHANGE UP (ref 3.8–10.5)
WBC # FLD AUTO: 6.7 K/UL — SIGNIFICANT CHANGE UP (ref 3.8–10.5)

## 2019-06-24 PROCEDURE — 80053 COMPREHEN METABOLIC PANEL: CPT

## 2019-06-24 PROCEDURE — 85610 PROTHROMBIN TIME: CPT

## 2019-06-24 PROCEDURE — 99284 EMERGENCY DEPT VISIT MOD MDM: CPT | Mod: 25

## 2019-06-24 PROCEDURE — 96366 THER/PROPH/DIAG IV INF ADDON: CPT

## 2019-06-24 PROCEDURE — 76377 3D RENDER W/INTRP POSTPROCES: CPT

## 2019-06-24 PROCEDURE — 90715 TDAP VACCINE 7 YRS/> IM: CPT

## 2019-06-24 PROCEDURE — 70486 CT MAXILLOFACIAL W/O DYE: CPT | Mod: 26

## 2019-06-24 PROCEDURE — 86900 BLOOD TYPING SEROLOGIC ABO: CPT

## 2019-06-24 PROCEDURE — 70450 CT HEAD/BRAIN W/O DYE: CPT

## 2019-06-24 PROCEDURE — 90471 IMMUNIZATION ADMIN: CPT

## 2019-06-24 PROCEDURE — 96365 THER/PROPH/DIAG IV INF INIT: CPT | Mod: XU

## 2019-06-24 PROCEDURE — 86850 RBC ANTIBODY SCREEN: CPT

## 2019-06-24 PROCEDURE — 86901 BLOOD TYPING SEROLOGIC RH(D): CPT

## 2019-06-24 PROCEDURE — 99285 EMERGENCY DEPT VISIT HI MDM: CPT

## 2019-06-24 PROCEDURE — 85027 COMPLETE CBC AUTOMATED: CPT

## 2019-06-24 PROCEDURE — 70450 CT HEAD/BRAIN W/O DYE: CPT | Mod: 26

## 2019-06-24 PROCEDURE — 99284 EMERGENCY DEPT VISIT MOD MDM: CPT | Mod: GC

## 2019-06-24 PROCEDURE — 70486 CT MAXILLOFACIAL W/O DYE: CPT

## 2019-06-24 PROCEDURE — 96375 TX/PRO/DX INJ NEW DRUG ADDON: CPT

## 2019-06-24 PROCEDURE — 82962 GLUCOSE BLOOD TEST: CPT

## 2019-06-24 PROCEDURE — 76377 3D RENDER W/INTRP POSTPROCES: CPT | Mod: 26

## 2019-06-24 PROCEDURE — 85730 THROMBOPLASTIN TIME PARTIAL: CPT

## 2019-06-24 RX ORDER — MORPHINE SULFATE 50 MG/1
2 CAPSULE, EXTENDED RELEASE ORAL ONCE
Refills: 0 | Status: DISCONTINUED | OUTPATIENT
Start: 2019-06-24 | End: 2019-06-24

## 2019-06-24 RX ORDER — TETANUS TOXOID, REDUCED DIPHTHERIA TOXOID AND ACELLULAR PERTUSSIS VACCINE, ADSORBED 5; 2.5; 8; 8; 2.5 [IU]/.5ML; [IU]/.5ML; UG/.5ML; UG/.5ML; UG/.5ML
0.5 SUSPENSION INTRAMUSCULAR ONCE
Refills: 0 | Status: COMPLETED | OUTPATIENT
Start: 2019-06-24 | End: 2019-06-24

## 2019-06-24 RX ORDER — LATANOPROST 0.05 MG/ML
1 SOLUTION/ DROPS OPHTHALMIC; TOPICAL AT BEDTIME
Refills: 0 | Status: DISCONTINUED | OUTPATIENT
Start: 2019-06-24 | End: 2019-06-28

## 2019-06-24 RX ORDER — VANCOMYCIN HCL 1 G
VIAL (EA) INTRAVENOUS
Refills: 0 | Status: DISCONTINUED | OUTPATIENT
Start: 2019-06-24 | End: 2019-06-24

## 2019-06-24 RX ORDER — DORZOLAMIDE HYDROCHLORIDE TIMOLOL MALEATE 20; 5 MG/ML; MG/ML
1 SOLUTION/ DROPS OPHTHALMIC ONCE
Refills: 0 | Status: COMPLETED | OUTPATIENT
Start: 2019-06-24 | End: 2019-06-24

## 2019-06-24 RX ORDER — ERYTHROMYCIN BASE 5 MG/GRAM
1 OINTMENT (GRAM) OPHTHALMIC (EYE) ONCE
Refills: 0 | Status: COMPLETED | OUTPATIENT
Start: 2019-06-24 | End: 2019-06-24

## 2019-06-24 RX ORDER — MORPHINE SULFATE 50 MG/1
4 CAPSULE, EXTENDED RELEASE ORAL ONCE
Refills: 0 | Status: DISCONTINUED | OUTPATIENT
Start: 2019-06-24 | End: 2019-06-24

## 2019-06-24 RX ORDER — ONDANSETRON 8 MG/1
4 TABLET, FILM COATED ORAL ONCE
Refills: 0 | Status: COMPLETED | OUTPATIENT
Start: 2019-06-24 | End: 2019-06-24

## 2019-06-24 RX ORDER — VANCOMYCIN HCL 1 G
1000 VIAL (EA) INTRAVENOUS ONCE
Refills: 0 | Status: COMPLETED | OUTPATIENT
Start: 2019-06-24 | End: 2019-06-24

## 2019-06-24 RX ADMIN — ONDANSETRON 4 MILLIGRAM(S): 8 TABLET, FILM COATED ORAL at 12:32

## 2019-06-24 RX ADMIN — TETANUS TOXOID, REDUCED DIPHTHERIA TOXOID AND ACELLULAR PERTUSSIS VACCINE, ADSORBED 0.5 MILLILITER(S): 5; 2.5; 8; 8; 2.5 SUSPENSION INTRAMUSCULAR at 12:31

## 2019-06-24 RX ADMIN — MORPHINE SULFATE 4 MILLIGRAM(S): 50 CAPSULE, EXTENDED RELEASE ORAL at 14:41

## 2019-06-24 RX ADMIN — MORPHINE SULFATE 4 MILLIGRAM(S): 50 CAPSULE, EXTENDED RELEASE ORAL at 12:31

## 2019-06-24 RX ADMIN — Medication 250 MILLIGRAM(S): at 14:41

## 2019-06-24 RX ADMIN — DORZOLAMIDE HYDROCHLORIDE TIMOLOL MALEATE 1 DROP(S): 20; 5 SOLUTION/ DROPS OPHTHALMIC at 18:11

## 2019-06-24 RX ADMIN — LATANOPROST 1 DROP(S): 0.05 SOLUTION/ DROPS OPHTHALMIC; TOPICAL at 18:12

## 2019-06-24 RX ADMIN — Medication 1 APPLICATION(S): at 18:11

## 2019-06-24 NOTE — ED PROVIDER NOTE - CLINICAL SUMMARY MEDICAL DECISION MAKING FREE TEXT BOX
ETHAN Jackson MD: Pt is a 64y F PMH HTN, depression presents with eye pain. Pt. had mechanical trip and fall prior to arrival, fell onto L eye. Patient unable to see out of L eye. +extensive chemosis and complete hyphema to L eye. Exam concerning for possible globe rupture. Opthalmology immediately paged for STAT consult. CTH/Maxillofacial performed to eval for fx, globe injury, ICH. Preop labs ordered. PPX ABX and tetanus vaccine ordered. Plastics facial fx consulted. Pain and nausea controlled with medications.

## 2019-06-24 NOTE — ED PROVIDER NOTE - CARE PLAN
Principal Discharge DX:	Eye injury, initial encounter Principal Discharge DX:	Orbital floor fracture  Secondary Diagnosis:	Eye injury, initial encounter

## 2019-06-24 NOTE — ED ADULT NURSE NOTE - OBJECTIVE STATEMENT
Pt is an ambulatory 64 yr old female a/oX 3 c/o mechanical fall with left eye injury.  Pt tripped and fell and metal handle hit her in the left eye.  No LOC or midline c spine pain or tenderness.  Right pupil PERRl wnl, left pupil non reactive.  Swelling, bleeding and ecchymosis to left eyelids, and orbit.  NSR on cm at 66 bpm.  No chest pain or sob.  No fevers, chills c/o N/V.  Abdomen NT ND.  No urinary symptoms.  peripheral pulses +2bl no edema

## 2019-06-24 NOTE — ED PROVIDER NOTE - OBJECTIVE STATEMENT
AV: 64y F PMH HTN, depression presents with eye pain. AV: 64y F PMH HTN, depression presents with eye pain. Pt. had mechanical trip and fall prior to arrival, fell onto L eye. Patient unable to see out of L eye. +Pain to eye and HA, moderate to severe pain. +Nausea. Denies preceeding syncope, palpitations, CP, SOB. Denies LOC. Denies focal numbness/weakness.

## 2019-06-24 NOTE — CONSULT NOTE ADULT - SUBJECTIVE AND OBJECTIVE BOX
HPI:  64F tripped while carrying groceries up her steps and struck her left globe against the door knob of her front door.  She was brought to [facility] ED and found to have a ruptured globe. Plastic surgery was consulted for evaluation of orbital floor fracture.  Of note, prior to exam patient had undergone a dilated pupil exam and had a shield placed over the ruptured globe.    PMH  Hypothyroidism  Anxiety  UTI (urinary tract infection)  HTN (hypertension)  Anxiety  HLD (hyperlipidemia)  HTN (hypertension)    PSH  S/P cholecystectomy    MEDS    Allergies    penicillin (Unknown)    Intolerances        Social        Physical Exam  T(C): 36.6 (06-24-19 @ 11:55), Max: 36.6 (06-24-19 @ 11:55)  HR: 65 (06-24-19 @ 12:21) (64 - 65)  BP: 168/74 (06-24-19 @ 12:21) (164/93 - 168/74)  RR: 18 (06-24-19 @ 12:21) (16 - 18)  SpO2: 98% (06-24-19 @ 12:21) (97% - 98%)  Wt(kg): --  Tmax: T(C): , Max: 36.6 (06-24-19 @ 11:55)  Wt(kg): --    Gen: NAD  HEENT: Left eye with shield in place, proptotic globe with hemorrhagic conjunctiva, dried blood at left nare  Upper 1/3 SILT, symmetric brow raise, no stepoffs or creptius  Middle 1/3: SILT V2, malar eminences symmetric, right eye with 4mm pupil (s/p dilation by ophtho), left eye proptotic with hemorrhagic sclera and swollen lid partially concealing eye.  Right eye EOMI.  Left eye, patient states she can move eye but sees "nothing but white".  Right eye vision grossly intact prior to dilated exam  Hearing intact.  Lower 1/3: FROM at TMJ, no intraoral lacerations or loose teeth, no sublingual hematoma    Labs:                        13.5   6.7   )-----------( 234      ( 24 Jun 2019 12:18 )             36.8     06-24    124<L>  |  85<L>  |  19  ----------------------------<  150<H>  3.8   |  26  |  0.66    Ca    9.3      24 Jun 2019 12:18    TPro  7.3  /  Alb  4.4  /  TBili  0.6  /  DBili  x   /  AST  28  /  ALT  23  /  AlkPhos  35<L>  06-24    PT/INR - ( 24 Jun 2019 12:18 )   PT: 11.6 sec;   INR: 1.01 ratio         PTT - ( 24 Jun 2019 12:18 )  PTT:23.3 sec          Imaging  < from: CT Maxillofacial No Cont (06.24.19 @ 12:32) >    Acute intraocular hemorrhage involves the left globe, involving the   anterior and posterior chambers. The globe demonstrates an irregular   shape most likely reflecting a globe rupture. Patchy retrobulbar   hemorrhage and edema is present.    A large left orbital floor fracture is present. The left orbital floor is   displaced approximately 1.4 cm inferiorly into the left maxillary sinus.   Herniation of orbital fat through the defect is noted. The inferior   rectus muscle partially enters the defect. An air hemorrhage level is   present within the left maxillary sinus secondary to the orbital floor   fracture. Mild left-sided proptosis is noted. Left periorbital soft   tissue swelling is noted. The medial wall appears preserved. The mandible   appears intact.    On the 3-D reformatted series, thelarge displaced left orbital floor   fracture is again noted.    I discussed the exam findings with the covering emergency room clinician   at 1:40 PM on 06/24/2019 with read back.    IMPRESSION:    1. On the head CT, there is no evidence for calvarial fracture or acute   intracranial hemorrhage.   2. On the maxillofacial CT, extensive intraocular hemorrhage involves the   left globe, likely with an associated globe rupture. A large left orbital   floor fracture is present, with the left orbital floor being displaced   approximately 1.4 cm into the left maxillary sinus, with an associated   air hemorrhage level.    < end of copied text >

## 2019-06-24 NOTE — CONSULT NOTE ADULT - SUBJECTIVE AND OBJECTIVE BOX
[facility] Ophthalmology Consult Note    HPI: 64 HTN, Anxiety/Depression s/p fall this am, was walking up stairs to house and tripped, no LOC, hit eye on metal door handle. C/o left eye pain, nausea w/o vomiting. Can only see "white" in left eye. Denies flashers, floaters. Last solid intake 10:30am, had water about 45 ptp.       PMH: As above  Meds: See sunrise  POcHx (including surgeries/lasers/trauma):  None  Drops: None  FamHx: None  Social Hx: None  Allergies: PCN    ROS:  General (neg), Vision (per HPI), Head and Neck (neg), Pulm (neg), CV (neg), GI (neg),  (neg), Musculoskeletal (neg), Skin/Integ (neg), Neuro (neg), Endocrine (neg), Heme (neg), All/Immuno (neg)    Mood and Affect Appropriate ( x ),  Oriented to Time, Place, and Person x 3 ( x )    Ophthalmology Exam    Visual acuity (cc with near card): 20/30 OD, HM with direction  Pupils: R&R OD, no view OS  Ttono: 16, 13 OS with gentle pressure check  Extraocular movements (EOMs): Full OU, no pain, no diplopia   Confrontational Visual Field (CVF):  Full OU  Color Plates: 12/12 OU    Pen Light Exam (PLE)  External:  Flat OU  Lids/Lashes/Lacrimal Ducts: Flat OU    Sclera/Conjunctiva:  W+Q OU  Cornea: Cl OU  Anterior Chamber: D+F OU  Iris:  Flat OU  Lens:  Cl OU    Fundus Exam: dilated with 1% tropicamide and 2.5% phenylephrine  Approval obtained from primary team for dilation  Patient aware that pupils can remained dilated for at least 4-6 hours  Exam performed with 20D lens    Vitreous: wnl OU  Disc, cup/disc: sharp and pink, 0.4 OU  Macula:  wnl OU  Vessels:  wnl OU  Periphery: wnl OU    Diagnostic Testing:      Assessment:      Plan:        Follow-Up:  Patient should follow up his/her ophthalmologist or in the [facility] Ophthalmology Practice within 1 week of discharge.  [address] [place] [id number]  [phone]    S/D/W Dr Self (attending) [facility] Ophthalmology Consult Note    HPI: 64 HTN, Anxiety/Depression s/p fall this am, was walking up stairs to house and tripped, no LOC, hit eye on metal door handle. C/o left eye pain, nausea w/o vomiting. Can only see "white" in left eye. Denies flashers, floaters. Last solid intake 10:30am, had water about 45 ptp.       PMH: As above  Meds: See sunrise  POcHx (including surgeries/lasers/trauma):  None  Drops: None  FamHx: None  Social Hx: None  Allergies: PCN    ROS:  General (neg), Vision (per HPI), Head and Neck (neg), Pulm (neg), CV (neg), GI (neg),  (neg), Musculoskeletal (neg), Skin/Integ (neg), Neuro (neg), Endocrine (neg), Heme (neg), All/Immuno (neg)    Mood and Affect Appropriate ( x ),  Oriented to Time, Place, and Person x 3 ( x )    Ophthalmology Exam    Visual acuity (cc with near card): 20/30 OD, HM with direction OS  Pupils: R&R OD, no view OS  Ttono: 16, 13 OS with gentle pressure check  Extraocular movements (EOMs): Full OD, 80% abduction and adduction, 30% supra and infraduction   Confrontational Visual Field (CVF):  Full OD, MAGGY 2.2 poor VA OS  Color Plates: 12/12 OD, MAGGY 2.2 poor VA OS    Slit Lamp Exam (SLE)  External/Lids/Lashes/Lacrimal Ducts: Flat OD, yary-orbital ecchymoses OS   Sclera/Conjunctiva:  W+Q OD, 360 bullous chemosis/subconj heme OS  Cornea: Cl OU, jose alberto neg OS  Anterior Chamber: D+Q OD, Complete hyphema OS, no view of iris or AC structures  Iris:  Flat OD, no view OS  Lens:  Cl OD, no view OS    Fundus Exam: dilated with 1% tropicamide and 2.5% phenylephrine  Approval obtained from primary team for dilation  Patient aware that pupils can remained dilated for at least 4-6 hours  Exam performed with 20D lens    Vitreous: wnl OD  Disc, cup/disc: sharp and pink, 0.4 OD  Macula:  wnl OD  Vessels:  wnl OD  Periphery: wnl OD    B-scan deferred OS 2/2 possible globe rupture    Diagnostic Testing:  < from: CT Maxillofacial No Cont (06.24.19 @ 12:32) >  Head CT:    Comparison is made to a head CT study from 09/17/2014.    There is no evidence for calvarial fracture, acute hemorrhage, acute   cortical infarct, mass effect, or hydrocephalus.     Mild chronic white matter changes and cerebral volume loss are again   noted.    Maxillofacial CT:    Acute intraocular hemorrhage involves the left globe, involving the   anterior and posterior chambers. The globe demonstrates an irregular   shape most likely reflecting a globe rupture. Patchy retrobulbar   hemorrhage and edema is present.    A large left orbital floor fracture is present. The left orbital floor is   displaced approximately 1.4 cm inferiorly into the left maxillary sinus.   Herniation of orbital fat through the defect is noted. The inferior   rectus muscle partially enters the defect. An air hemorrhage level is   present within the left maxillary sinus secondary to the orbital floor   fracture. Mild left-sided proptosis is noted. Left periorbital soft   tissue swelling is noted. The medial wall appears preserved. The mandible   appears intact.    On the 3-D reformatted series, thelarge displaced left orbital floor   fracture is again noted.    I discussed the exam findings with the covering emergency room clinician   at 1:40 PM on 06/24/2019 with read back.    IMPRESSION:    1. On the head CT, there is no evidence for calvarial fracture or acute   intracranial hemorrhage.   2. On the maxillofacial CT, extensive intraocular hemorrhage involves the   left globe, likely with an associated globe rupture. A large left orbital   floor fracture is present, with the left orbital floor being displaced   approximately 1.4 cm into the left maxillary sinus, with an associated   air hemorrhage level.    < end of copied text >        Assessment/Plan:  64 F HTN, Depression/Anxiety s/p fall with trauma OS, possible posterior globe rupture OS and large inf floor fx OS  - NPO for now  - Eye shield at all times  - s/p Vanc and Levaquin  - Tetanus ppx  - bed rest  - avoid ice packs 2/2 possible globe rupture, avoid nose blowing  - chief resident to eval and assess this afternoon  - OMFS eval for floor fx    Follow-Up:  Patient should follow up his/her ophthalmologist or in the [facility] Ophthalmology Practice within 1 week of discharge.  [address] [place] [address]  [phone]    S/D/W Dr Self (attending) [facility] Ophthalmology Consult Note    HPI: 64 HTN, Anxiety/Depression s/p fall this am, was walking up stairs to house and tripped, no LOC, hit left eye on metal door handle. C/o left eye pain, nausea w/o vomiting. Can only see "white" in left eye. Denies flashers, floaters. Last solid intake 10:30am, had water about 45 ptp.       PMH: As above  Meds: See sunrise  POcHx (including surgeries/lasers/trauma):  None  Drops: None  FamHx: None  Social Hx: None  Allergies: PCN    ROS:  General (neg), Vision (per HPI), Head and Neck (neg), Pulm (neg), CV (neg), GI (neg),  (neg), Musculoskeletal (neg), Skin/Integ (neg), Neuro (neg), Endocrine (neg), Heme (neg), All/Immuno (neg)    Mood and Affect Appropriate ( x ),  Oriented to Time, Place, and Person x 3 ( x )    Ophthalmology Exam    Visual acuity (cc with near card): 20/30 OD, HM with direction OS  Pupils: R&R OD, no view OS  Ttono: 16, 13 OS with gentle pressure check  Extraocular movements (EOMs): Full OD, 80% abduction and adduction, 30% supra and infraduction   Confrontational Visual Field (CVF):  Full OD, MAGGY 2.2 poor VA OS  Color Plates: 12/12 OD, MAGGY 2.2 poor VA OS    Slit Lamp Exam (SLE)  External/Lids/Lashes/Lacrimal Ducts: Flat OD, yary-orbital ecchymosis and heme, lids not tense OS   Sclera/Conjunctiva:  W+Q OD, 360 bullous chemosis/subconj heme OS  Cornea: Cl OU, jose alberto neg OS, no lacs appreciated OS  Anterior Chamber: D+Q OD, Complete hyphema OS, no view of iris or AC structures, ? vit in the a/c  Iris:  Flat OD, no view OS  Lens:  Cl OD, no view OS    Fundus Exam: dilated with 1% tropicamide and 2.5% phenylephrine  Approval obtained from primary team for dilation  Patient aware that pupils can remained dilated for at least 4-6 hours  Exam performed with 20D lens    No view OS    Vitreous: wnl OD  Disc, cup/disc: sharp and pink, 0.4 OD  Macula:  wnl OD  Vessels:  wnl OD  Periphery: wnl OD    B-scan deferred OS 2/2 possible globe rupture    Diagnostic Testing:  < from: CT Maxillofacial No Cont (06.24.19 @ 12:32) >  Head CT:    Comparison is made to a head CT study from 09/17/2014.    There is no evidence for calvarial fracture, acute hemorrhage, acute   cortical infarct, mass effect, or hydrocephalus.     Mild chronic white matter changes and cerebral volume loss are again   noted.    Maxillofacial CT:    Acute intraocular hemorrhage involves the left globe, involving the   anterior and posterior chambers. The globe demonstrates an irregular   shape most likely reflecting a globe rupture. Patchy retrobulbar   hemorrhage and edema is present.    A large left orbital floor fracture is present. The left orbital floor is   displaced approximately 1.4 cm inferiorly into the left maxillary sinus.   Herniation of orbital fat through the defect is noted. The inferior   rectus muscle partially enters the defect. An air hemorrhage level is   present within the left maxillary sinus secondary to the orbital floor   fracture. Mild left-sided proptosis is noted. Left periorbital soft   tissue swelling is noted. The medial wall appears preserved. The mandible   appears intact.    On the 3-D reformatted series, thelarge displaced left orbital floor   fracture is again noted.    I discussed the exam findings with the covering emergency room clinician   at 1:40 PM on 06/24/2019 with read back.    IMPRESSION:    1. On the head CT, there is no evidence for calvarial fracture or acute   intracranial hemorrhage.   2. On the maxillofacial CT, extensive intraocular hemorrhage involves the   left globe, likely with an associated globe rupture. A large left orbital   floor fracture is present, with the left orbital floor being displaced   approximately 1.4 cm into the left maxillary sinus, with an associated   air hemorrhage level.    < end of copied text >        Assessment/Plan:  64 F HTN, Depression/Anxiety s/p fall with trauma OS, possible posterior globe rupture OS and large inf floor fx OS.  Pt also has evidence of retrobulbar hematoma on imaging, but no evidence of orbital compartment syndrome.  Pt also has complete hyphema and likely vit hemorrhage (based on CT imaging), however will hold off on drops at this time due to possibly open globe.  Will wait for ophthalmology trauma attending oncall for further management decisions.  - NPO for now  - Eye shield at all times (placed by ophtho in the ED), no pressure on the globe  - s/p Vanc and Levaquin  - Tetanus ppx  - no blood thinners unless medically necessary  - bed rest/HOB elevated  - orbital CT reviewed  - ED/primary team to control pain and nausea  - avoid ice packs 2/2 possible globe rupture, avoid nose blowing  - chief resident (Dr. Elizabeth Lao) to eval and assess this afternoon  - OMFS/facial plastics eval for floor fx  - findings and plan discussed with patient, family member, primary team  - case discussed with senior resident Elizabeth franco, and trauma attending joan, Dr. Hieu Brand    S/D/W Dr Self (attending)

## 2019-06-24 NOTE — CONSULT NOTE ADULT - ATTENDING COMMENTS
I have interviewed and examined the patient and reviewed the residents note including the history, exam, assessment, and plan.  I agree with the residents assessment and plan.    64 F HTN, Depression/Anxiety s/p fall with trauma OS, possible posterior globe rupture OS and large inf floor fx OS.  Pt also has evidence of retrobulbar hematoma on imaging, but no evidence of orbital compartment syndrome.  Pt also has complete hyphema and likely vit hemorrhage (based on CT imaging), however will hold off on drops at this time due to possibly open globe.  Will wait for ophthalmology trauma attending joan for further management decisions.  - NPO for now  - Eye shield at all times (placed by ophtho in the ED), no pressure on the globe  - s/p Vanc and Levaquin  - Tetanus ppx  - no blood thinners unless medically necessary  - bed rest/HOB elevated  - orbital CT reviewed  - ED/primary team to control pain and nausea  - avoid ice packs 2/2 possible globe rupture, avoid nose blowing  - chief resident (Dr. Elizabeth Lao) to eval and assess this afternoon to decide further management  - OMFS/facial plastics eval for floor fx  - findings and plan discussed with patient, family member, primary team  - case discussed with senior resident Elizabeth franco, and trauma attending Dr. Hieu franco MD

## 2019-06-24 NOTE — ED ADULT NURSE NOTE - NSIMPLEMENTINTERV_GEN_ALL_ED
Implemented All Fall Risk Interventions:  [place] to call system. Call bell, personal items and telephone within reach. Instruct patient to call for assistance. Room bathroom lighting operational. Non-slip footwear when patient is off stretcher. Physically safe environment: no spills, clutter or unnecessary equipment. Stretcher in lowest position, wheels locked, appropriate side rails in place. Provide visual cue, wrist band, yellow gown, etc. Monitor gait and stability. Monitor for mental status changes and reorient to person, place, and time. Review medications for side effects contributing to fall risk. Reinforce activity limits and safety measures with patient and family.

## 2019-06-24 NOTE — ED PROVIDER NOTE - NSFOLLOWUPINSTRUCTIONS_ED_ALL_ED_FT
1) Please return to the ED should you have any new or worsening symptoms, worsening pain, develop chest pain, difficulty breathing, or any concerning symptoms. Please do not touch the eye. Please keep the eye shield on, sleep on opposite side of face, and to not touch eye.  2) Please follow up with Optho at their office tomorrow at 9 am at [address] Please do not eat past midnight

## 2019-06-24 NOTE — ED PROVIDER NOTE - PROGRESS NOTE DETAILS
ETHAN Jackson MD: Rec'd phonecall from radiology with critical findings on CT scan, demonstrating extensive intraocular hemorrhage, irregular globe shape concerning for globe rupture, retroorbital hemorrhage, orbital floor fx with inferior rectus m. entrapment. These results were d/w Opthalmology resident, who is examining pt at this time; Opthalmology attg to see pt. ETHAN Jackson MD: Pt being evaluated by Opthalmology attg ETHAN Jackson MD: Pt being evaluated by general Opthalmology attg ETHAN Jackson MD: Spoke with Dr. Michele from Opthalmology, who reviewed CT scan, is not convinced that pt has globe rupture based upon scan, is on his way to evaluate patient and will make a determination for further recommendations and plan. ETHAN Jackson MD: Plastics Facial fx surgeon has seen pt, awaiting their final recommendations ETHAN Jackson MD: Patient's  and family member updated on results, plan. All questions answered. Spoke with patient's private Opthalmologist over the phone who is aware of plan. ED Sign out, eval for fall / eye / facial trauma, pending Plastics / Ophtho recs for disposition -- Nathan Palacio MD Judson Mena (Resident): Optho - good to go home, IOP 35, patent will see them in the office tomorrow at 9 am, patient nPO at midnight - patient updated on results-  will send with eye shield and opthal. erythromycin but not to apply until instructed by optho tomorrow - safe to d/c home home Judson Mena (Resident): Optho - good to go home, IOP 35, patent will see them in the office tomorrow at 9 am, patient nPO at midnight - patient updated on results-  will send with eye shield and opthal. erythromycin but not to apply until instructed by optho tomorrow - safe to d/c home home - gave patient  eye drops for pressure as well, wrote down on d/c papers how to take - gave bottles to go home with - patient given chance to ask questions - safe to d/c

## 2019-06-24 NOTE — CONSULT NOTE ADULT - ASSESSMENT
A/P 64F s/p mechanical fall with left orbital floor fracture and left globe rupture  - Large orbital floor defect would be indicated for operative repair; however, given patient also has associated globe rupture, defer to ophtho for emergent treatment of ruptured globe  - Patient is to be transferred to [facility]; patient may follow up with Dr Frey as an outpatient for elective orbital floor repair. She may call [phone] to schedule an appointment.    Liliana Woodward PGY3

## 2019-06-25 ENCOUNTER — APPOINTMENT (OUTPATIENT)
Dept: OPHTHALMOLOGY | Facility: CLINIC | Age: 64
End: 2019-06-25
Payer: MEDICARE

## 2019-06-25 ENCOUNTER — NON-APPOINTMENT (OUTPATIENT)
Age: 64
End: 2019-06-25

## 2019-06-25 PROCEDURE — 92002 INTRM OPH EXAM NEW PATIENT: CPT

## 2019-06-26 ENCOUNTER — NON-APPOINTMENT (OUTPATIENT)
Age: 64
End: 2019-06-26

## 2019-06-26 ENCOUNTER — APPOINTMENT (OUTPATIENT)
Dept: OPHTHALMOLOGY | Facility: CLINIC | Age: 64
End: 2019-06-26
Payer: MEDICARE

## 2019-06-26 PROCEDURE — 92012 INTRM OPH EXAM EST PATIENT: CPT

## 2019-07-02 ENCOUNTER — APPOINTMENT (OUTPATIENT)
Dept: OPHTHALMOLOGY | Facility: CLINIC | Age: 64
End: 2019-07-02
Payer: MEDICARE

## 2019-07-02 ENCOUNTER — NON-APPOINTMENT (OUTPATIENT)
Age: 64
End: 2019-07-02

## 2019-07-02 PROCEDURE — 92012 INTRM OPH EXAM EST PATIENT: CPT

## 2019-07-05 DIAGNOSIS — F33.3 MAJOR DEPRESSIVE DISORDER, RECURRENT, SEVERE WITH PSYCHOTIC SYMPTOMS: ICD-10-CM

## 2019-07-09 ENCOUNTER — NON-APPOINTMENT (OUTPATIENT)
Age: 64
End: 2019-07-09

## 2019-07-09 ENCOUNTER — APPOINTMENT (OUTPATIENT)
Dept: OPHTHALMOLOGY | Facility: CLINIC | Age: 64
End: 2019-07-09
Payer: MEDICARE

## 2019-07-09 PROCEDURE — 92012 INTRM OPH EXAM EST PATIENT: CPT

## 2019-07-12 ENCOUNTER — TRANSCRIPTION ENCOUNTER (OUTPATIENT)
Age: 64
End: 2019-07-12

## 2019-07-17 PROCEDURE — 90870 ELECTROCONVULSIVE THERAPY: CPT

## 2019-07-23 ENCOUNTER — APPOINTMENT (OUTPATIENT)
Dept: OPHTHALMOLOGY | Facility: CLINIC | Age: 64
End: 2019-07-23
Payer: MEDICARE

## 2019-07-23 ENCOUNTER — NON-APPOINTMENT (OUTPATIENT)
Age: 64
End: 2019-07-23

## 2019-07-23 PROCEDURE — 92012 INTRM OPH EXAM EST PATIENT: CPT

## 2019-07-29 PROCEDURE — 90870 ELECTROCONVULSIVE THERAPY: CPT

## 2019-08-12 PROCEDURE — 90870 ELECTROCONVULSIVE THERAPY: CPT

## 2019-08-22 ENCOUNTER — APPOINTMENT (OUTPATIENT)
Dept: OPHTHALMOLOGY | Facility: CLINIC | Age: 64
End: 2019-08-22
Payer: MEDICARE

## 2019-08-22 ENCOUNTER — NON-APPOINTMENT (OUTPATIENT)
Age: 64
End: 2019-08-22

## 2019-08-22 PROCEDURE — 92012 INTRM OPH EXAM EST PATIENT: CPT

## 2019-09-05 PROCEDURE — 90870 ELECTROCONVULSIVE THERAPY: CPT

## 2019-09-17 ENCOUNTER — APPOINTMENT (OUTPATIENT)
Dept: OPHTHALMOLOGY | Facility: CLINIC | Age: 64
End: 2019-09-17
Payer: MEDICARE

## 2019-09-17 ENCOUNTER — NON-APPOINTMENT (OUTPATIENT)
Age: 64
End: 2019-09-17

## 2019-09-17 PROCEDURE — 92014 COMPRE OPH EXAM EST PT 1/>: CPT

## 2019-10-03 PROCEDURE — 90870 ELECTROCONVULSIVE THERAPY: CPT

## 2019-10-31 PROCEDURE — 90870 ELECTROCONVULSIVE THERAPY: CPT

## 2019-11-29 PROCEDURE — 90870 ELECTROCONVULSIVE THERAPY: CPT

## 2019-12-27 PROCEDURE — 90870 ELECTROCONVULSIVE THERAPY: CPT

## 2020-01-07 ENCOUNTER — APPOINTMENT (OUTPATIENT)
Dept: OPHTHALMOLOGY | Facility: CLINIC | Age: 65
End: 2020-01-07
Payer: MEDICARE

## 2020-01-07 ENCOUNTER — NON-APPOINTMENT (OUTPATIENT)
Age: 65
End: 2020-01-07

## 2020-01-07 PROCEDURE — 92012 INTRM OPH EXAM EST PATIENT: CPT

## 2020-04-11 NOTE — ED ADULT TRIAGE NOTE - MODE OF ARRIVAL
Reason for Disposition  • [1] Body aches, chills, diarrhea, headache, runny nose, or sore throat occur AND [2] within 14 days of CORONAVIRUS EXPOSURE    Protocols used: CORONAVIRUS (2019-NCOV) EXPOSURE-A-AH    Pt calls with c/o chills, fatigue, nausea, vomiting, diarrhea, ABD pain, body aches. Pt has h/o DM and HTN.  Has not taken BS  yet today. Advised to monitor BS closely and stay well hydrated. Denies fever (97.1), cough, SOB, wheezing, new onset loss of taste or smell, known exposure to COVID. Pt works out of her home. No recent travels. Discussed monitoring from home, staying well hydrated and educated Pt on preventing exposure. With Pt's history and sx, Pt is in agreement with virtual/telephone visit.    EMS

## 2020-07-14 ENCOUNTER — APPOINTMENT (OUTPATIENT)
Dept: OPHTHALMOLOGY | Facility: CLINIC | Age: 65
End: 2020-07-14
Payer: MEDICARE

## 2020-07-14 ENCOUNTER — NON-APPOINTMENT (OUTPATIENT)
Age: 65
End: 2020-07-14

## 2020-07-14 PROCEDURE — 92012 INTRM OPH EXAM EST PATIENT: CPT

## 2020-09-29 ENCOUNTER — TRANSCRIPTION ENCOUNTER (OUTPATIENT)
Age: 65
End: 2020-09-29

## 2020-10-26 ENCOUNTER — TRANSCRIPTION ENCOUNTER (OUTPATIENT)
Age: 65
End: 2020-10-26

## 2020-11-24 ENCOUNTER — NON-APPOINTMENT (OUTPATIENT)
Age: 65
End: 2020-11-24

## 2020-11-24 ENCOUNTER — APPOINTMENT (OUTPATIENT)
Dept: OPHTHALMOLOGY | Facility: CLINIC | Age: 65
End: 2020-11-24
Payer: MEDICARE

## 2020-11-24 PROCEDURE — 92014 COMPRE OPH EXAM EST PT 1/>: CPT

## 2021-06-22 NOTE — DISCHARGE NOTE ADULT - CLICK TO LAUNCH ORM
"[facility] Clinic Note    Reema Hunter  2002   [phone]    Reema Hunter Kitty Godoy DO        .

## 2021-07-22 ENCOUNTER — APPOINTMENT (OUTPATIENT)
Dept: OPHTHALMOLOGY | Facility: CLINIC | Age: 66
End: 2021-07-22
Payer: MEDICARE

## 2021-07-22 ENCOUNTER — NON-APPOINTMENT (OUTPATIENT)
Age: 66
End: 2021-07-22

## 2021-07-22 PROCEDURE — 92012 INTRM OPH EXAM EST PATIENT: CPT

## 2021-12-14 ENCOUNTER — APPOINTMENT (OUTPATIENT)
Dept: OPHTHALMOLOGY | Facility: CLINIC | Age: 66
End: 2021-12-14
Payer: MEDICARE

## 2021-12-14 ENCOUNTER — NON-APPOINTMENT (OUTPATIENT)
Age: 66
End: 2021-12-14

## 2021-12-14 PROCEDURE — 92014 COMPRE OPH EXAM EST PT 1/>: CPT

## 2022-04-27 ENCOUNTER — EMERGENCY (EMERGENCY)
Facility: HOSPITAL | Age: 67
LOS: 1 days | Discharge: HOME | End: 2022-04-27
Attending: EMERGENCY MEDICINE
Payer: MEDICARE

## 2022-04-27 VITALS
RESPIRATION RATE: 18 BRPM | SYSTOLIC BLOOD PRESSURE: 162 MMHG | OXYGEN SATURATION: 98 % | WEIGHT: 184.97 LBS | HEIGHT: 69 IN | DIASTOLIC BLOOD PRESSURE: 84 MMHG | TEMPERATURE: 98 F | HEART RATE: 78 BPM

## 2022-04-27 DIAGNOSIS — Z90.49 ACQUIRED ABSENCE OF OTHER SPECIFIED PARTS OF DIGESTIVE TRACT: Chronic | ICD-10-CM

## 2022-04-27 PROCEDURE — 70450 CT HEAD/BRAIN W/O DYE: CPT | Mod: 26,MA

## 2022-04-27 PROCEDURE — 99284 EMERGENCY DEPT VISIT MOD MDM: CPT | Mod: GC

## 2022-04-27 PROCEDURE — 70486 CT MAXILLOFACIAL W/O DYE: CPT | Mod: 26,MA

## 2022-04-27 RX ORDER — ACETAMINOPHEN 500 MG
975 TABLET ORAL ONCE
Refills: 0 | Status: COMPLETED | OUTPATIENT
Start: 2022-04-27 | End: 2022-04-27

## 2022-04-27 RX ADMIN — Medication 975 MILLIGRAM(S): at 23:30

## 2022-04-28 VITALS
HEART RATE: 72 BPM | TEMPERATURE: 98 F | DIASTOLIC BLOOD PRESSURE: 93 MMHG | RESPIRATION RATE: 16 BRPM | SYSTOLIC BLOOD PRESSURE: 144 MMHG | OXYGEN SATURATION: 94 %

## 2022-04-28 PROCEDURE — 73630 X-RAY EXAM OF FOOT: CPT

## 2022-04-28 PROCEDURE — 73590 X-RAY EXAM OF LOWER LEG: CPT

## 2022-04-28 PROCEDURE — 73080 X-RAY EXAM OF ELBOW: CPT | Mod: 26,RT

## 2022-04-28 PROCEDURE — 73090 X-RAY EXAM OF FOREARM: CPT

## 2022-04-28 PROCEDURE — 73130 X-RAY EXAM OF HAND: CPT | Mod: 26,RT

## 2022-04-28 PROCEDURE — 73564 X-RAY EXAM KNEE 4 OR MORE: CPT

## 2022-04-28 PROCEDURE — 73130 X-RAY EXAM OF HAND: CPT

## 2022-04-28 PROCEDURE — 73090 X-RAY EXAM OF FOREARM: CPT | Mod: 26,LT

## 2022-04-28 PROCEDURE — 73610 X-RAY EXAM OF ANKLE: CPT

## 2022-04-28 PROCEDURE — 73590 X-RAY EXAM OF LOWER LEG: CPT | Mod: 26,50

## 2022-04-28 PROCEDURE — 73564 X-RAY EXAM KNEE 4 OR MORE: CPT | Mod: 26,50

## 2022-04-28 PROCEDURE — 70450 CT HEAD/BRAIN W/O DYE: CPT | Mod: MA

## 2022-04-28 PROCEDURE — 73080 X-RAY EXAM OF ELBOW: CPT

## 2022-04-28 PROCEDURE — 73552 X-RAY EXAM OF FEMUR 2/>: CPT

## 2022-04-28 PROCEDURE — 73610 X-RAY EXAM OF ANKLE: CPT | Mod: 26,RT

## 2022-04-28 PROCEDURE — 73630 X-RAY EXAM OF FOOT: CPT | Mod: 26,RT

## 2022-04-28 PROCEDURE — 76377 3D RENDER W/INTRP POSTPROCES: CPT

## 2022-04-28 PROCEDURE — 73552 X-RAY EXAM OF FEMUR 2/>: CPT | Mod: 26,50

## 2022-04-28 PROCEDURE — 70486 CT MAXILLOFACIAL W/O DYE: CPT | Mod: MA

## 2022-04-28 PROCEDURE — 99284 EMERGENCY DEPT VISIT MOD MDM: CPT | Mod: 25

## 2022-04-28 RX ORDER — IBUPROFEN 200 MG
600 TABLET ORAL ONCE
Refills: 0 | Status: COMPLETED | OUTPATIENT
Start: 2022-04-28 | End: 2022-04-28

## 2022-04-28 RX ADMIN — Medication 600 MILLIGRAM(S): at 03:50

## 2022-04-28 NOTE — ED PROVIDER NOTE - PROGRESS NOTE DETAILS
Blayne ANDRE (PGY-2)  explained results of w/u to pt. given return precautions. ambulating independently. will d/c to home to with return precautions and pcp f/u

## 2022-04-28 NOTE — ED PROVIDER NOTE - NS ED ROS FT
CONST: no fevers, no chills  EYES:  no vision changes  ENT: no sore throat  CV: no chest pain, no leg swelling  RESP: no shortness of breath, no cough  ABD: no abdominal pain, no nausea, no vomiting, no diarrhea  : no dysuria, no flank pain, no hematuria  MSK: no back pain, +extremity pain, +face pain  SKIN:  no rash

## 2022-04-28 NOTE — ED PROVIDER NOTE - OBJECTIVE STATEMENT
67F PMH HTN p/w fall. Was going up stairs when she had a mechanical trip/fall on top step. Fell forwards and hit R side of face, both knees, and R arm. Endorsing R nose pain, R face pain, R arm pain, and b/l LE pain. No f/c, chest pain, SOB, abd pain, leg swelling, A/C use, dizziness. 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 Received tdap last yr

## 2022-04-28 NOTE — ED PROVIDER NOTE - CLINICAL SUMMARY MEDICAL DECISION MAKING FREE TEXT BOX
67F PMH HTN p/w mechanical fall. VSS in ED. TTP of extremities/face, abrasions noted to R face, neuro intact  Will need CT head/max-face, xrays of extremities, pain control, reassessment of symptoms

## 2022-04-28 NOTE — ED PROVIDER NOTE - CARE PLAN
1 Principal Discharge DX:	Fall   Principal Discharge DX:	Accident due to mechanical fall without injury, initial encounter   Principal Discharge DX:	Facial abrasion, initial encounter  Secondary Diagnosis:	Knee contusion

## 2022-04-28 NOTE — ED PROVIDER NOTE - NSICDXFAMILYHX_GEN_ALL_CORE_FT
FAMILY HISTORY:  Family history of diabetes mellitus  Family history of hypertension  Family history of prostate cancer    Mother  Still living? Unknown  Family history of cancer, Age at diagnosis: Age Unknown

## 2022-04-28 NOTE — ED PROVIDER NOTE - NSICDXPASTMEDICALHX_GEN_ALL_CORE_FT
PAST MEDICAL HISTORY:  Anxiety     HLD (hyperlipidemia)     HTN (hypertension)     Hypothyroidism

## 2022-04-28 NOTE — ED ADULT NURSE NOTE - OBJECTIVE STATEMENT
67 y.o. F A&Ox4 PMHx of depresion, HTN, HLD, presenting to ED via walk-in s/p trip and fall. Pt states that her foot got caught on a rug and she tripped, falling down two cement steps and onto pavement. Pt states that she landed on her knees and face, but denies LOC. Pt states that she has pain in bilateral knees, the top of her right foot, her right elbow, Left pointer finger, and nose. Pt denies any epistaxis. Pt denies prior lightheadedness, palpitations, chest pain, SOB. Pt denies current H/A, lightheadedness/dizziness, vision changes, SOB, chest pain, abd pain, N/V/D, constipation, dysuria, hematuria, hematochezia, weakness, numbness, and tingling. Upon assessment _______. 67 y.o. F A&Ox4 PMHx of depresion, HTN, HLD, presenting to ED via walk-in s/p trip and fall. Pt states that her foot got caught on a rug and she tripped, falling down two cement steps and onto pavement. Pt states that she landed on her knees and face, but denies LOC. Pt states that she has pain in bilateral knees, the top of her right foot, her right elbow, Left pointer finger, and nose. Pt denies any epistaxis. Pt denies prior lightheadedness, palpitations, chest pain, SOB. Pt denies current H/A, lightheadedness/dizziness, vision changes, SOB, chest pain, abd pain, N/V/D, constipation, dysuria, hematuria, hematochezia, weakness, numbness, and tingling. Upon assessment abrasions noted to right side of face on forehead and next to eye, small abrasion to right pinky finger, abrasion to left shin, and deformity to top of right foot. Pt grossly neurologically intact with no epistaxis noted. Pupils PERRLA, strength +5 in all four extremities. Daughter at bedside. Pt resting in stretcher in position of comfort. Stretcher locked and lowered and call bell within reach.

## 2022-04-28 NOTE — ED PROVIDER NOTE - PRINCIPAL DIAGNOSIS
Fall Accident due to mechanical fall without injury, initial encounter Facial abrasion, initial encounter

## 2022-04-28 NOTE — ED PROVIDER NOTE - PATIENT PORTAL LINK FT
You can access the FollowMyHealth Patient Portal offered by [facility] by registering at the following website: [email or web address]. By joining [facility]’s FollowMyHealth portal, you will also be able to view your health information using other applications (apps) compatible with our system.

## 2022-04-28 NOTE — ED PROVIDER NOTE - PHYSICAL EXAMINATION
Physical Exam:  Gen: awake alert   Head: NCAT  HEENT: abrasion to R face, TTP of nasal bone, EOMI, normal conjunctiva, oral mucosa moist  Lung: CTAB, no respiratory distress, no wheezes/rhonchi/rales B/L, speaking in full sentences  CV: RRR  Abd: soft, NT, ND, no guarding, no rigidity, no rebound tenderness, no CVA tenderness   MSK: no visible deformities, TTP of b/l knees/R forearm/R hand/R foot/R ankle, passive ROM normal in UE/LE, no spinal tenderness   Neuro: No focal sensory or motor deficits  Skin: Warm, well perfused, no rash, no leg swelling  ~Jose Cisneros MD (PGY-2)

## 2022-04-28 NOTE — ED PROVIDER NOTE - NSFOLLOWUPINSTRUCTIONS_ED_ALL_ED_FT
You were evaluated today for a fall. Please take over the counter pain medications such as Motrin (600mg every 6 hours) and Tylenol (650mg every 4 hours) for pain    Follow up with your primary care doctor within the next 2-3 days for further evaluation of your symptoms    Return to the Emergency Department if you have any new or worsening symptoms, including but not limited to severe pain despite medications, inability to walk, chest pain, difficulty breathing, vision changes, or weakness

## 2022-04-28 NOTE — ED PROVIDER NOTE - ATTENDING CONTRIBUTION TO CARE
Afebrile. Awake and Alert. Right frontal TTP and nasal bone TTP, superficial abrasion to right side of face. Lungs CTA. Heart RRR. Abdomen soft NTND. CN II-XII grossly intact. Moves all extremities without lateralization.

## 2022-05-07 NOTE — CONSULT NOTE ADULT - ATTENDING COMMENTS
Patient seen and examined, agree with above assessment and plan as transcribed above.    - f/u stress test  - Would D/C norvasc and cont marcin Jung MD, FACC 07-May-2022 22:09

## 2022-06-14 ENCOUNTER — APPOINTMENT (OUTPATIENT)
Dept: OPHTHALMOLOGY | Facility: CLINIC | Age: 67
End: 2022-06-14
Payer: MEDICARE

## 2022-06-14 ENCOUNTER — NON-APPOINTMENT (OUTPATIENT)
Age: 67
End: 2022-06-14

## 2022-06-14 PROCEDURE — 92012 INTRM OPH EXAM EST PATIENT: CPT

## 2022-06-27 ENCOUNTER — NON-APPOINTMENT (OUTPATIENT)
Age: 67
End: 2022-06-27

## 2022-06-27 ENCOUNTER — APPOINTMENT (OUTPATIENT)
Dept: OPHTHALMOLOGY | Facility: CLINIC | Age: 67
End: 2022-06-27

## 2022-06-27 PROCEDURE — 92012 INTRM OPH EXAM EST PATIENT: CPT

## 2022-07-22 ENCOUNTER — APPOINTMENT (OUTPATIENT)
Dept: WOUND CARE | Facility: CLINIC | Age: 67
End: 2022-07-22

## 2022-07-22 DIAGNOSIS — R60.0 LOCALIZED EDEMA: ICD-10-CM

## 2022-07-22 DIAGNOSIS — H54.40 BLINDNESS, ONE EYE, UNSPECIFIED EYE: ICD-10-CM

## 2022-07-22 PROCEDURE — 99205 OFFICE O/P NEW HI 60 MIN: CPT | Mod: 25

## 2022-07-22 PROCEDURE — 11042 DBRDMT SUBQ TIS 1ST 20SQCM/<: CPT

## 2022-07-22 NOTE — ASSESSMENT
[FreeTextEntry1] : 7-22-22:\par Pt with wound s/p fall that is non healing.  Has seen dermatoilogy and refered her\par Pt states nate legs swell on occasion and she has spider veins\par On exam:\par LLE wound with soft eschar, no s/s of infection\par s/p excisional debridement\par spider veins noted

## 2022-07-22 NOTE — PHYSICAL EXAM
[2+] : left 2+ [Please See PDF for Tissue Analytics] : Please See PDF for [employer] Analytics. [Ankle Swelling (On Exam)] : not present [de-identified] : NAD [de-identified] : NCAT [de-identified] : EOMI [de-identified] : equal chest rise [FreeTextEntry1] : spider veins

## 2022-07-22 NOTE — PLAN
[FreeTextEntry1] : 7-22-22\par :Plan:\par honey/foam qd\par supplies ordered\par Will order venous reflux study\par f/u 2 weeks \par Telehealth instructions given\par

## 2022-07-22 NOTE — HISTORY OF PRESENT ILLNESS
[FreeTextEntry1] : Ms. JUANA OLSON presents to the office with a wound since april 2022 after a fall. The wound is located on the Left Medial leg . The patient has complaints of pain and discomfort. The patient has been dressing the wound with MupirocinThe patient denies fevers or chills. The patient has localized pain to the wound upon dressing changes. The patient has no other complaints or associated symptoms. \par \par

## 2022-08-03 ENCOUNTER — APPOINTMENT (OUTPATIENT)
Dept: WOUND CARE | Facility: CLINIC | Age: 67
End: 2022-08-03

## 2022-08-03 VITALS — BODY MASS INDEX: 25.48 KG/M2 | TEMPERATURE: 96.4 F | WEIGHT: 172 LBS | HEIGHT: 69 IN

## 2022-08-03 PROCEDURE — 11042 DBRDMT SUBQ TIS 1ST 20SQCM/<: CPT

## 2022-08-03 RX ORDER — CICLOPIROX 80 MG/ML
8 SOLUTION TOPICAL
Qty: 7 | Refills: 0 | Status: ACTIVE | COMMUNITY
Start: 2022-06-06

## 2022-08-03 RX ORDER — LOSARTAN POTASSIUM AND HYDROCHLOROTHIAZIDE 25; 100 MG/1; MG/1
100-25 TABLET ORAL
Qty: 90 | Refills: 0 | Status: ACTIVE | COMMUNITY
Start: 2022-06-30

## 2022-08-03 RX ORDER — MUPIROCIN 20 MG/G
2 OINTMENT TOPICAL
Qty: 22 | Refills: 0 | Status: DISCONTINUED | COMMUNITY
Start: 2022-05-11

## 2022-08-03 RX ORDER — VENLAFAXINE HYDROCHLORIDE 150 MG/1
150 CAPSULE, EXTENDED RELEASE ORAL
Qty: 30 | Refills: 0 | Status: ACTIVE | COMMUNITY
Start: 2022-03-15

## 2022-08-03 RX ORDER — VALACYCLOVIR 1 G/1
1 TABLET, FILM COATED ORAL
Qty: 16 | Refills: 0 | Status: DISCONTINUED | COMMUNITY
Start: 2022-02-09

## 2022-08-03 RX ORDER — ATORVASTATIN CALCIUM 20 MG/1
20 TABLET, FILM COATED ORAL
Qty: 90 | Refills: 0 | Status: ACTIVE | COMMUNITY
Start: 2022-08-01

## 2022-08-03 RX ORDER — DOXYCYCLINE HYCLATE 100 MG/1
100 CAPSULE ORAL
Qty: 14 | Refills: 0 | Status: DISCONTINUED | COMMUNITY
Start: 2022-05-11

## 2022-08-03 NOTE — PHYSICAL EXAM
[2+] : left 2+ [Please See PDF for Tissue Analytics] : Please See PDF for [employer] Analytics. [Ankle Swelling (On Exam)] : not present [de-identified] : NAD [de-identified] : EOMI [de-identified] : NCAT [de-identified] : equal chest rise [FreeTextEntry1] : spider veins

## 2022-08-03 NOTE — ASSESSMENT
[FreeTextEntry1] : 7-22-22:\par Pt with wound s/p fall that is non healing.  Has seen dermatoilogy and refered her\par Pt states her legs swell on occasion and she has spider veins\par On exam:\par LLE wound with soft eschar, no s/s of infection\par s/p excisional debridement\par spider veins noted\par \par 8/3/2022\par Pt here for f/u\par Wound appears smaller\par Patient to schedule vascular testing\par On exam:\par slough to wound bed\par No s/s of infection\par s/p excisional debridement\par

## 2022-08-03 NOTE — PLAN
[FreeTextEntry1] : 7-22-22\par :Plan:\par honey/foam qd\par supplies ordered\par Will order venous reflux study\par f/u 2 weeks \par Telehealth instructions given\par \par 8/3/2022\par Plan-\par c/w honey and foam\par OTC compression socks if feel legs swell .  Pt feels they are to hot right now\par f/u 2-3 weeks\par f/u 2-3 weeks\par

## 2022-08-17 ENCOUNTER — APPOINTMENT (OUTPATIENT)
Dept: VASCULAR SURGERY | Facility: CLINIC | Age: 67
End: 2022-08-17

## 2022-08-17 PROCEDURE — 93970 EXTREMITY STUDY: CPT

## 2022-08-19 ENCOUNTER — APPOINTMENT (OUTPATIENT)
Dept: WOUND CARE | Facility: CLINIC | Age: 67
End: 2022-08-19

## 2022-08-19 VITALS
DIASTOLIC BLOOD PRESSURE: 85 MMHG | HEART RATE: 78 BPM | BODY MASS INDEX: 25.48 KG/M2 | TEMPERATURE: 97.4 F | HEIGHT: 69 IN | WEIGHT: 172 LBS | SYSTOLIC BLOOD PRESSURE: 144 MMHG

## 2022-08-19 DIAGNOSIS — S81.802A UNSPECIFIED OPEN WOUND, LEFT LOWER LEG, INITIAL ENCOUNTER: ICD-10-CM

## 2022-08-19 PROCEDURE — 11042 DBRDMT SUBQ TIS 1ST 20SQCM/<: CPT

## 2022-08-19 NOTE — HISTORY OF PRESENT ILLNESS
[FreeTextEntry1] : Ms. JUANA OLSON presents to the office with a wound since april 2022 after a fall. The wound is located on the Left Medial leg . The patient has complaints of pain and discomfort. The patient has been dressing the wound with MupirocinThe patient denies fevers or chills. The patient has localized pain to the wound upon dressing changes. The patient has no other complaints or associated symptoms. \par \par \par 8/19/22\par Patient has no new complaints. Patient denies any fever or chills, denies any purulent drainage or malodor. She has been dressing her wound with Medi Honey. \par

## 2022-08-19 NOTE — PHYSICAL EXAM
[2+] : left 2+ [Varicose Veins Of Lower Extremities] : bilaterally [Alert] : alert [Oriented to Person] : oriented to person [Oriented to Place] : oriented to place [Oriented to Time] : oriented to time [Calm] : calm [Please See PDF for Tissue Analytics] : Please See PDF for Tissue Analytics. [Ankle Swelling (On Exam)] : not present [de-identified] : NAD [de-identified] : NCAT [de-identified] : EOMI [de-identified] : No increased work of breathing or use of accessory muscles. No wheezing or stridor.  [FreeTextEntry1] : Varicose veins present. Extremities are warm, capillary refill < 2 sec  [de-identified] : No appreciable muscle-wasting. Full ROM in all 4 extremities. No deformity.  [de-identified] : LLE wound, see TA

## 2022-08-19 NOTE — PLAN
[FreeTextEntry1] : 7-22-22\par :Plan:\par honey/foam qd\par supplies ordered\par Will order venous reflux study\par f/u 2 weeks \par Telehealth instructions given\par \par 8/3/2022\par Plan-\par c/w honey and foam\par OTC compression socks if feel legs swell .  Pt feels they are to hot right now\par f/u 2-3 weeks\par \par 8/19/22\par Honey, foam and Hypafix to LLE medial malleolar wound\par Measurements taken for compression stockings, prescription given to patient\par Will discuss prospect of GSV ablation with Vascular (Dr. Galan)\par Follow up in the Wound Care clinic in 2-3 weeks. \par \par

## 2022-08-19 NOTE — ASSESSMENT
[FreeTextEntry1] : 7-22-22:\par Pt with wound s/p fall that is non healing.  Has seen dermatoilogy and refered her\par Pt states her legs swell on occasion and she has spider veins\par On exam:\par LLE wound with soft eschar, no s/s of infection\par s/p excisional debridement\par spider veins noted\par \par 8/3/2022\par Pt here for f/u\par Wound appears smaller\par Patient to schedule vascular testing\par On exam:\par slough to wound bed\par No s/s of infection\par s/p excisional debridement\par \par \par 8/19/22\par Patient presents for f/u\par LLE ulcer is nearly closed with healthy epithelial tissue. No local or systemic signs/symptoms of infection. No purulent discharge, no blanching erythema, no malodor, no crepitus or bullae formation. \par Excisional debridement of wound slough and non-viable/necrotic tissue was performed to a maximum depth of 0.2 cm i\par Given findings of severe B/L superficial venous system reflux in the presence of wound formation, patient was counseled as to the benefit of both consistent compression therapy as well as GSV ablative procedures. She was measured for prescription compression stockings, script written. She will consider saphenous ablation.

## 2022-09-01 ENCOUNTER — APPOINTMENT (OUTPATIENT)
Dept: WOUND CARE | Facility: CLINIC | Age: 67
End: 2022-09-01

## 2022-09-01 VITALS — TEMPERATURE: 97.5 F | HEIGHT: 69 IN | WEIGHT: 172 LBS | BODY MASS INDEX: 25.48 KG/M2

## 2022-09-01 DIAGNOSIS — L97.929 VARICOSE VEINS OF LEFT LOWER EXTREMITY WITH ULCER OF UNSPECIFIED SITE: ICD-10-CM

## 2022-09-01 DIAGNOSIS — I83.029 VARICOSE VEINS OF LEFT LOWER EXTREMITY WITH ULCER OF UNSPECIFIED SITE: ICD-10-CM

## 2022-09-01 DIAGNOSIS — T14.90XA INJURY, UNSPECIFIED, INITIAL ENCOUNTER: ICD-10-CM

## 2022-09-01 PROCEDURE — 99214 OFFICE O/P EST MOD 30 MIN: CPT

## 2022-09-02 PROBLEM — I83.029: Status: ACTIVE | Noted: 2022-08-19

## 2022-09-02 PROBLEM — T14.90XA CLOSED WOUND: Status: ACTIVE | Noted: 2022-09-02

## 2022-09-02 NOTE — PHYSICAL EXAM
[2+] : left 2+ [Varicose Veins Of Lower Extremities] : bilaterally [Alert] : alert [Oriented to Person] : oriented to person [Oriented to Place] : oriented to place [Oriented to Time] : oriented to time [Calm] : calm [Please See PDF for Tissue Analytics] : Please See PDF for Tissue Analytics. [Ankle Swelling (On Exam)] : not present [de-identified] : NAD [de-identified] : NCAT [de-identified] : EOMI [de-identified] : No increased work of breathing or use of accessory muscles. No wheezing or stridor.  [FreeTextEntry1] : Varicose veins present. Extremities are warm, capillary refill < 2 sec  [de-identified] : No appreciable muscle-wasting. Full ROM in all 4 extremities. No deformity.  [de-identified] : LLE wound, see TA

## 2022-09-02 NOTE — PLAN
[FreeTextEntry1] : 9/1/22\par Plan - cavilon applied\par compression garment\par follow up if concerns or questions

## 2022-12-05 NOTE — ED PROVIDER NOTE - CPE EDP EYES NORM
The patient has been examined and the H&P has been reviewed:    I concur with the findings and no changes have occurred since H&P was written.    Surgery risks, benefits and alternative options discussed and understood by patient/family.          There are no hospital problems to display for this patient.    
normal...

## 2022-12-13 ENCOUNTER — APPOINTMENT (OUTPATIENT)
Dept: OPHTHALMOLOGY | Facility: CLINIC | Age: 67
End: 2022-12-13
Payer: MEDICARE

## 2022-12-13 ENCOUNTER — NON-APPOINTMENT (OUTPATIENT)
Age: 67
End: 2022-12-13

## 2022-12-13 PROCEDURE — 92014 COMPRE OPH EXAM EST PT 1/>: CPT

## 2022-12-13 PROCEDURE — 92250 FUNDUS PHOTOGRAPHY W/I&R: CPT

## 2023-02-20 ENCOUNTER — NON-APPOINTMENT (OUTPATIENT)
Age: 68
End: 2023-02-20

## 2023-06-11 ENCOUNTER — NON-APPOINTMENT (OUTPATIENT)
Age: 68
End: 2023-06-11

## 2023-06-13 ENCOUNTER — APPOINTMENT (OUTPATIENT)
Dept: OPHTHALMOLOGY | Facility: CLINIC | Age: 68
End: 2023-06-13
Payer: MEDICARE

## 2023-06-13 ENCOUNTER — NON-APPOINTMENT (OUTPATIENT)
Age: 68
End: 2023-06-13

## 2023-06-13 PROCEDURE — 92012 INTRM OPH EXAM EST PATIENT: CPT

## 2023-09-05 ENCOUNTER — APPOINTMENT (OUTPATIENT)
Dept: OBGYN | Facility: CLINIC | Age: 68
End: 2023-09-05
Payer: MEDICARE

## 2023-09-05 VITALS
DIASTOLIC BLOOD PRESSURE: 85 MMHG | OXYGEN SATURATION: 96 % | HEIGHT: 69 IN | SYSTOLIC BLOOD PRESSURE: 140 MMHG | HEART RATE: 78 BPM | BODY MASS INDEX: 27.7 KG/M2 | WEIGHT: 187 LBS | RESPIRATION RATE: 15 BRPM

## 2023-09-05 DIAGNOSIS — N83.209 UNSPECIFIED OVARIAN CYST, UNSPECIFIED SIDE: ICD-10-CM

## 2023-09-05 DIAGNOSIS — Z86.59 PERSONAL HISTORY OF OTHER MENTAL AND BEHAVIORAL DISORDERS: ICD-10-CM

## 2023-09-05 PROCEDURE — 99203 OFFICE O/P NEW LOW 30 MIN: CPT

## 2023-09-05 NOTE — END OF VISIT
[FreeTextEntry3] : I, Vianey Martinez, acted solely as a scribe for Dr. Nat Marquez MD., on 09/05/2023.  All medical record entries made by the scribe were at my, Dr. Nat Marquez MD., direction and personally dictated by me on 09/05/2023. I have personally reviewed the chart and agree that the record accurately reflects my personal performance of the history, physical exam, assessment and plan.

## 2023-09-05 NOTE — HISTORY OF PRESENT ILLNESS
[FreeTextEntry1] : JUANA OLSON is a 68 year old presenting for consult visit. Pt reports having a cysts for about 5 yrs and has recently grew bigger. Pt reports menopausal since her 40s, recently has had hot flashes. Denies any constitutional sx. willing to accept a blood transfusion  no personal family hx of vte, no family hx of gyn cancer no hx of abnormal pap, UTD on colonoscopy, mammogram   ob hx:  x3, mabx 1  GYN: none PMH: skin CA FMH: pancreatic ca- mother, prostate ca- father, breast ca- paternal aunt    US pelvis: 2.5cm ovarian cyst, increasing in size (from 1.2cm)--to scan into chart

## 2023-09-05 NOTE — PLAN
[FreeTextEntry1] : JUANA OLSON is a 68 year old presenting for consult visit for enlarging ovarian cyst -ova1 today -discussed rec for lap BSO, reviewed risks of surgery including but not limited to VTE, SSI, damage to bowel, bladder, ureter, scar tissue, need for laparotomy, transfusion. Reviewed risk of malignancy. Discussed post operative care. All questions answered.  tasked to be booked needs PCP, card clearance Nat Marquez MD

## 2023-10-19 ENCOUNTER — OUTPATIENT (OUTPATIENT)
Dept: OUTPATIENT SERVICES | Facility: HOSPITAL | Age: 68
LOS: 1 days | End: 2023-10-19
Payer: MEDICARE

## 2023-10-19 VITALS
OXYGEN SATURATION: 98 % | HEIGHT: 68 IN | TEMPERATURE: 98 F | WEIGHT: 188.94 LBS | SYSTOLIC BLOOD PRESSURE: 123 MMHG | DIASTOLIC BLOOD PRESSURE: 80 MMHG | HEART RATE: 72 BPM | RESPIRATION RATE: 16 BRPM

## 2023-10-19 DIAGNOSIS — N83.209 UNSPECIFIED OVARIAN CYST, UNSPECIFIED SIDE: ICD-10-CM

## 2023-10-19 DIAGNOSIS — Z98.890 OTHER SPECIFIED POSTPROCEDURAL STATES: Chronic | ICD-10-CM

## 2023-10-19 DIAGNOSIS — Z90.49 ACQUIRED ABSENCE OF OTHER SPECIFIED PARTS OF DIGESTIVE TRACT: Chronic | ICD-10-CM

## 2023-10-19 DIAGNOSIS — I10 ESSENTIAL (PRIMARY) HYPERTENSION: ICD-10-CM

## 2023-10-19 LAB
ANION GAP SERPL CALC-SCNC: 12 MMOL/L — SIGNIFICANT CHANGE UP (ref 5–17)
ANION GAP SERPL CALC-SCNC: 12 MMOL/L — SIGNIFICANT CHANGE UP (ref 5–17)
BLD GP AB SCN SERPL QL: NEGATIVE — SIGNIFICANT CHANGE UP
BLD GP AB SCN SERPL QL: NEGATIVE — SIGNIFICANT CHANGE UP
BUN SERPL-MCNC: 21 MG/DL — SIGNIFICANT CHANGE UP (ref 7–23)
BUN SERPL-MCNC: 21 MG/DL — SIGNIFICANT CHANGE UP (ref 7–23)
CALCIUM SERPL-MCNC: 9.7 MG/DL — SIGNIFICANT CHANGE UP (ref 8.4–10.5)
CALCIUM SERPL-MCNC: 9.7 MG/DL — SIGNIFICANT CHANGE UP (ref 8.4–10.5)
CHLORIDE SERPL-SCNC: 100 MMOL/L — SIGNIFICANT CHANGE UP (ref 96–108)
CHLORIDE SERPL-SCNC: 100 MMOL/L — SIGNIFICANT CHANGE UP (ref 96–108)
CO2 SERPL-SCNC: 26 MMOL/L — SIGNIFICANT CHANGE UP (ref 22–31)
CO2 SERPL-SCNC: 26 MMOL/L — SIGNIFICANT CHANGE UP (ref 22–31)
CREAT SERPL-MCNC: 0.68 MG/DL — SIGNIFICANT CHANGE UP (ref 0.5–1.3)
CREAT SERPL-MCNC: 0.68 MG/DL — SIGNIFICANT CHANGE UP (ref 0.5–1.3)
EGFR: 94 ML/MIN/1.73M2 — SIGNIFICANT CHANGE UP
EGFR: 94 ML/MIN/1.73M2 — SIGNIFICANT CHANGE UP
GLUCOSE SERPL-MCNC: 72 MG/DL — SIGNIFICANT CHANGE UP (ref 70–99)
GLUCOSE SERPL-MCNC: 72 MG/DL — SIGNIFICANT CHANGE UP (ref 70–99)
HCT VFR BLD CALC: 37.5 % — SIGNIFICANT CHANGE UP (ref 34.5–45)
HCT VFR BLD CALC: 37.5 % — SIGNIFICANT CHANGE UP (ref 34.5–45)
HGB BLD-MCNC: 12.6 G/DL — SIGNIFICANT CHANGE UP (ref 11.5–15.5)
HGB BLD-MCNC: 12.6 G/DL — SIGNIFICANT CHANGE UP (ref 11.5–15.5)
MCHC RBC-ENTMCNC: 33.5 PG — SIGNIFICANT CHANGE UP (ref 27–34)
MCHC RBC-ENTMCNC: 33.5 PG — SIGNIFICANT CHANGE UP (ref 27–34)
MCHC RBC-ENTMCNC: 33.6 GM/DL — SIGNIFICANT CHANGE UP (ref 32–36)
MCHC RBC-ENTMCNC: 33.6 GM/DL — SIGNIFICANT CHANGE UP (ref 32–36)
MCV RBC AUTO: 99.7 FL — SIGNIFICANT CHANGE UP (ref 80–100)
MCV RBC AUTO: 99.7 FL — SIGNIFICANT CHANGE UP (ref 80–100)
NRBC # BLD: 0 /100 WBCS — SIGNIFICANT CHANGE UP (ref 0–0)
NRBC # BLD: 0 /100 WBCS — SIGNIFICANT CHANGE UP (ref 0–0)
PLATELET # BLD AUTO: 217 K/UL — SIGNIFICANT CHANGE UP (ref 150–400)
PLATELET # BLD AUTO: 217 K/UL — SIGNIFICANT CHANGE UP (ref 150–400)
POTASSIUM SERPL-MCNC: 4.1 MMOL/L — SIGNIFICANT CHANGE UP (ref 3.5–5.3)
POTASSIUM SERPL-MCNC: 4.1 MMOL/L — SIGNIFICANT CHANGE UP (ref 3.5–5.3)
POTASSIUM SERPL-SCNC: 4.1 MMOL/L — SIGNIFICANT CHANGE UP (ref 3.5–5.3)
POTASSIUM SERPL-SCNC: 4.1 MMOL/L — SIGNIFICANT CHANGE UP (ref 3.5–5.3)
RBC # BLD: 3.76 M/UL — LOW (ref 3.8–5.2)
RBC # BLD: 3.76 M/UL — LOW (ref 3.8–5.2)
RBC # FLD: 14.5 % — SIGNIFICANT CHANGE UP (ref 10.3–14.5)
RBC # FLD: 14.5 % — SIGNIFICANT CHANGE UP (ref 10.3–14.5)
RH IG SCN BLD-IMP: POSITIVE — SIGNIFICANT CHANGE UP
RH IG SCN BLD-IMP: POSITIVE — SIGNIFICANT CHANGE UP
SODIUM SERPL-SCNC: 138 MMOL/L — SIGNIFICANT CHANGE UP (ref 135–145)
SODIUM SERPL-SCNC: 138 MMOL/L — SIGNIFICANT CHANGE UP (ref 135–145)
WBC # BLD: 5.66 K/UL — SIGNIFICANT CHANGE UP (ref 3.8–10.5)
WBC # BLD: 5.66 K/UL — SIGNIFICANT CHANGE UP (ref 3.8–10.5)
WBC # FLD AUTO: 5.66 K/UL — SIGNIFICANT CHANGE UP (ref 3.8–10.5)
WBC # FLD AUTO: 5.66 K/UL — SIGNIFICANT CHANGE UP (ref 3.8–10.5)

## 2023-10-19 PROCEDURE — 86850 RBC ANTIBODY SCREEN: CPT

## 2023-10-19 PROCEDURE — 80048 BASIC METABOLIC PNL TOTAL CA: CPT

## 2023-10-19 PROCEDURE — G0463: CPT

## 2023-10-19 PROCEDURE — 85027 COMPLETE CBC AUTOMATED: CPT

## 2023-10-19 PROCEDURE — 86900 BLOOD TYPING SEROLOGIC ABO: CPT

## 2023-10-19 PROCEDURE — 86901 BLOOD TYPING SEROLOGIC RH(D): CPT

## 2023-10-19 RX ORDER — SODIUM CHLORIDE 9 MG/ML
3 INJECTION INTRAMUSCULAR; INTRAVENOUS; SUBCUTANEOUS EVERY 8 HOURS
Refills: 0 | Status: DISCONTINUED | OUTPATIENT
Start: 2023-11-09 | End: 2023-11-23

## 2023-10-19 RX ORDER — SODIUM CHLORIDE 9 MG/ML
1000 INJECTION, SOLUTION INTRAVENOUS
Refills: 0 | Status: DISCONTINUED | OUTPATIENT
Start: 2023-11-09 | End: 2023-11-23

## 2023-10-19 NOTE — H&P PST ADULT - ASSESSMENT
DASI: able to go up one flight of stairs or walk 1-2 blocks with out difficulty  Loose teeth: denies

## 2023-10-19 NOTE — H&P PST ADULT - PROBLEM SELECTOR PLAN 1
ERP Lap El Salpingooophorectomy on 11/9/23.  Medical Eval pending   Pre-op education provided - all questions answered. Pt verbalized understanding

## 2023-10-19 NOTE — H&P PST ADULT - NSANTHOSAYNRD_GEN_A_CORE
No. CONCETTA screening performed.  STOP BANG Legend: 0-2 = LOW Risk; 3-4 = INTERMEDIATE Risk; 5-8 = HIGH Risk

## 2023-10-19 NOTE — H&P PST ADULT - ATTENDING COMMENTS
Attending Note    Pt with enlarging adnexal mass, here today for laparoscopic Bilateral Salpingo-oophorectomy, reviewed risks of procedure including but not limited to VTE, SSI, damage to bowel, bladder ureter thermal damage, need for laparotomy, reviewed risk of malignancy. Discussed post operative course. Understands learners are part of her case and performing EUA. All questions answered    Nat Marquez MD

## 2023-10-19 NOTE — H&P PST ADULT - HISTORY OF PRESENT ILLNESS
69 y/o female with h/o HTN, HLD, Depression s/p ECT, A-fib single episode s/p ECT (2018), GERD c/o ovarian cysts for about 5 yrs and has recently grew bigger, recent US showed  2.5cm ovarian cyst, increasing in size (from 1.2cm). Today she presents to PST for scheduled ERP Lap El Salpingooophorectomy on 11/9/23. Denies any palpitations, SOB, N/V, fever or chills.

## 2023-10-19 NOTE — H&P PST ADULT - NSICDXPASTMEDICALHX_GEN_ALL_CORE_FT
PAST MEDICAL HISTORY:  2019 novel coronavirus disease (COVID-19)     Anxiety and depression     HLD (hyperlipidemia)     HTN (hypertension)     Hypothyroidism

## 2023-10-19 NOTE — H&P PST ADULT - NSICDXPASTSURGICALHX_GEN_ALL_CORE_FT
PAST SURGICAL HISTORY:  History of hand surgery     S/P cholecystectomy 6 yrs ago    S/P ECT (electroconvulsive therapy)

## 2023-10-19 NOTE — H&P PST ADULT - BIRTH SEX
What Type Of Note Output Would You Prefer (Optional)?: Standard Output
What Is The Reason For Today's Visit?: Full Body Skin Examination
What Is The Reason For Today's Visit? (Being Monitored For X): concerning skin lesions on an annual basis
Female

## 2023-11-03 PROBLEM — U07.1 COVID-19: Chronic | Status: ACTIVE | Noted: 2023-10-19

## 2023-11-03 PROBLEM — F41.9 ANXIETY DISORDER, UNSPECIFIED: Chronic | Status: ACTIVE | Noted: 2023-10-19

## 2023-11-03 PROBLEM — N83.209 UNSPECIFIED OVARIAN CYST, UNSPECIFIED SIDE: Chronic | Status: ACTIVE | Noted: 2023-10-19

## 2023-11-08 ENCOUNTER — TRANSCRIPTION ENCOUNTER (OUTPATIENT)
Age: 68
End: 2023-11-08

## 2023-11-09 ENCOUNTER — TRANSCRIPTION ENCOUNTER (OUTPATIENT)
Age: 68
End: 2023-11-09

## 2023-11-09 ENCOUNTER — RESULT REVIEW (OUTPATIENT)
Age: 68
End: 2023-11-09

## 2023-11-09 ENCOUNTER — OUTPATIENT (OUTPATIENT)
Dept: OUTPATIENT SERVICES | Facility: HOSPITAL | Age: 68
LOS: 1 days | End: 2023-11-09
Payer: MEDICARE

## 2023-11-09 ENCOUNTER — APPOINTMENT (OUTPATIENT)
Dept: OBGYN | Facility: HOSPITAL | Age: 68
End: 2023-11-09

## 2023-11-09 VITALS
HEIGHT: 68 IN | RESPIRATION RATE: 16 BRPM | DIASTOLIC BLOOD PRESSURE: 90 MMHG | SYSTOLIC BLOOD PRESSURE: 157 MMHG | TEMPERATURE: 97 F | OXYGEN SATURATION: 95 % | WEIGHT: 188.94 LBS | HEART RATE: 72 BPM

## 2023-11-09 VITALS
OXYGEN SATURATION: 96 % | HEART RATE: 68 BPM | TEMPERATURE: 97 F | DIASTOLIC BLOOD PRESSURE: 88 MMHG | SYSTOLIC BLOOD PRESSURE: 139 MMHG | RESPIRATION RATE: 15 BRPM

## 2023-11-09 DIAGNOSIS — Z90.49 ACQUIRED ABSENCE OF OTHER SPECIFIED PARTS OF DIGESTIVE TRACT: Chronic | ICD-10-CM

## 2023-11-09 DIAGNOSIS — Z98.890 OTHER SPECIFIED POSTPROCEDURAL STATES: Chronic | ICD-10-CM

## 2023-11-09 DIAGNOSIS — N83.209 UNSPECIFIED OVARIAN CYST, UNSPECIFIED SIDE: ICD-10-CM

## 2023-11-09 PROCEDURE — 58661 LAPAROSCOPY REMOVE ADNEXA: CPT | Mod: LT

## 2023-11-09 PROCEDURE — C9399: CPT

## 2023-11-09 PROCEDURE — 88112 CYTOPATH CELL ENHANCE TECH: CPT

## 2023-11-09 PROCEDURE — 88307 TISSUE EXAM BY PATHOLOGIST: CPT

## 2023-11-09 PROCEDURE — 88112 CYTOPATH CELL ENHANCE TECH: CPT | Mod: 26

## 2023-11-09 PROCEDURE — 88307 TISSUE EXAM BY PATHOLOGIST: CPT | Mod: 26

## 2023-11-09 PROCEDURE — 58661 LAPAROSCOPY REMOVE ADNEXA: CPT

## 2023-11-09 RX ORDER — VENLAFAXINE HCL 75 MG
1 CAPSULE, EXT RELEASE 24 HR ORAL
Refills: 0 | DISCHARGE

## 2023-11-09 RX ORDER — LIDOCAINE HCL 20 MG/ML
0.2 VIAL (ML) INJECTION ONCE
Refills: 0 | Status: COMPLETED | OUTPATIENT
Start: 2023-11-09 | End: 2023-11-09

## 2023-11-09 RX ORDER — EZETIMIBE 10 MG/1
1 TABLET ORAL
Refills: 0 | DISCHARGE

## 2023-11-09 RX ORDER — CHLORHEXIDINE GLUCONATE 213 G/1000ML
1 SOLUTION TOPICAL ONCE
Refills: 0 | Status: COMPLETED | OUTPATIENT
Start: 2023-11-09 | End: 2023-11-09

## 2023-11-09 RX ORDER — LOSARTAN/HYDROCHLOROTHIAZIDE 100MG-25MG
1 TABLET ORAL
Refills: 0 | DISCHARGE

## 2023-11-09 RX ORDER — GABAPENTIN 400 MG/1
600 CAPSULE ORAL ONCE
Refills: 0 | Status: COMPLETED | OUTPATIENT
Start: 2023-11-09 | End: 2023-11-09

## 2023-11-09 RX ORDER — CEFAZOLIN SODIUM 1 G
2000 VIAL (EA) INJECTION ONCE
Refills: 0 | Status: COMPLETED | OUTPATIENT
Start: 2023-11-09 | End: 2023-11-09

## 2023-11-09 RX ORDER — ACETAMINOPHEN 500 MG
1000 TABLET ORAL ONCE
Refills: 0 | Status: COMPLETED | OUTPATIENT
Start: 2023-11-09 | End: 2023-11-09

## 2023-11-09 RX ORDER — LEVOTHYROXINE SODIUM 125 MCG
1 TABLET ORAL
Refills: 0 | DISCHARGE

## 2023-11-09 RX ORDER — OXYCODONE HYDROCHLORIDE 5 MG/1
1 TABLET ORAL
Qty: 8 | Refills: 0
Start: 2023-11-09

## 2023-11-09 RX ORDER — HYDROMORPHONE HYDROCHLORIDE 2 MG/ML
0.25 INJECTION INTRAMUSCULAR; INTRAVENOUS; SUBCUTANEOUS
Refills: 0 | Status: DISCONTINUED | OUTPATIENT
Start: 2023-11-09 | End: 2023-11-09

## 2023-11-09 RX ORDER — ACETAMINOPHEN 500 MG
2 TABLET ORAL
Qty: 0 | Refills: 0 | DISCHARGE
Start: 2023-11-09

## 2023-11-09 RX ORDER — SODIUM CHLORIDE 9 MG/ML
1000 INJECTION, SOLUTION INTRAVENOUS
Refills: 0 | Status: DISCONTINUED | OUTPATIENT
Start: 2023-11-09 | End: 2023-11-23

## 2023-11-09 RX ORDER — ONDANSETRON 8 MG/1
4 TABLET, FILM COATED ORAL ONCE
Refills: 0 | Status: DISCONTINUED | OUTPATIENT
Start: 2023-11-09 | End: 2023-11-23

## 2023-11-09 RX ORDER — CELECOXIB 200 MG/1
400 CAPSULE ORAL ONCE
Refills: 0 | Status: COMPLETED | OUTPATIENT
Start: 2023-11-09 | End: 2023-11-09

## 2023-11-09 RX ORDER — ATORVASTATIN CALCIUM 80 MG/1
1 TABLET, FILM COATED ORAL
Refills: 0 | DISCHARGE

## 2023-11-09 RX ORDER — IBUPROFEN 200 MG
3 TABLET ORAL
Qty: 0 | Refills: 0 | DISCHARGE

## 2023-11-09 RX ADMIN — SODIUM CHLORIDE 3 MILLILITER(S): 9 INJECTION INTRAMUSCULAR; INTRAVENOUS; SUBCUTANEOUS at 08:58

## 2023-11-09 RX ADMIN — Medication 1000 MILLIGRAM(S): at 09:33

## 2023-11-09 RX ADMIN — CELECOXIB 400 MILLIGRAM(S): 200 CAPSULE ORAL at 09:33

## 2023-11-09 RX ADMIN — CHLORHEXIDINE GLUCONATE 1 APPLICATION(S): 213 SOLUTION TOPICAL at 09:34

## 2023-11-09 RX ADMIN — GABAPENTIN 600 MILLIGRAM(S): 400 CAPSULE ORAL at 09:33

## 2023-11-09 RX ADMIN — SODIUM CHLORIDE 100 MILLILITER(S): 9 INJECTION, SOLUTION INTRAVENOUS at 09:34

## 2023-11-09 NOTE — ASU DISCHARGE PLAN (ADULT/PEDIATRIC) - CARE PROVIDER_API CALL
Nat Marquez  Obstetrics and Gynecology  [address] [place] [address], [facility] [address]  [place] [address]  Phone: [phone]  Fax: [phone]  Follow Up Time: 2 weeks

## 2023-11-09 NOTE — ASU DISCHARGE PLAN (ADULT/PEDIATRIC) - ASU DC SPECIAL INSTRUCTIONSFT
Postoperative Instructions    For pain control, take the followin. Ibuprofen 600mg every 6 hours, take with food  2. Add 975mg every 6 hours, alternated with ibuprofen  Tylenol and ibuprofen may be obtained over the counter.  3. Oxycodone 5mg every 6 hours as needed for severe pain. A prescription has been sent to your pharmacy.    Return to your regular way of eating.     Resume normal activity as tolerated, but no heavy lifting or strenuous activity for 6 weeks. Complete vaginal rest, no tampons, no douching, no tub bathing, no sexual activities for 6 weeks unless otherwise instructed by your doctor.      No driving while on narcotic pain medication.      Call your doctor with any signs and symptoms of infection such as fever (>100.4 F), chills, nausea or vomiting.  Call your doctor if you're unable to tolerate food or have difficulty urinating.  Call your doctor if you have pain that is not relieved by your prescribed medications. Call your doctor with redness or swelling at the incision site, fluid leakage or wound separation.    Notify your doctor with any other concerns. Follow up with your doctor in 2 weeks for a post-operative appointment.

## 2023-11-09 NOTE — CHART NOTE - NSCHARTNOTEFT_GEN_A_CORE
R2 Post-op Check    Patient seen and examined at bedside. No acute complaints. Pain well controlled. Patient tolerating clears. Has not yet passed flatus. Voided 100cc. Denies CP, SOB, N/V, fevers, and chills.    Vital Signs Last 24 Hours  T(C): 36.4 (11-09-23 @ 13:00), Max: 36.4 (11-09-23 @ 13:00)  HR: 68 (11-09-23 @ 14:45) (67 - 72)  BP: 157/71 (11-09-23 @ 14:45) (114/75 - 157/90)  RR: 15 (11-09-23 @ 14:45) (11 - 18)  SpO2: 95% (11-09-23 @ 14:45) (92% - 98%)    I&O's Summary    09 Nov 2023 07:01  -  09 Nov 2023 15:28  --------------------------------------------------------  IN: 1090 mL / OUT: 390 mL / NET: 700 mL        Physical Exam:  General: NAD  CV: clinically well perfused  Lungs: unlabored respirations  Abdomen: Soft, non-distended, appropriately tender, no rebound or guarding  Incision: Steri strips/Opsite 80% saturated with blood and changed. Incision c/d/i  Ext: No pain or swelling      MEDICATIONS  (STANDING):  lactated ringers. 1000 milliLiter(s) (100 mL/Hr) IV Continuous <Continuous>  lactated ringers. 1000 milliLiter(s) (100 mL/Hr) IV Continuous <Continuous>  lactated ringers. 1000 milliLiter(s) (125 mL/Hr) IV Continuous <Continuous>  sodium chloride 0.9% lock flush 3 milliLiter(s) IV Push every 8 hours    MEDICATIONS  (PRN):  HYDROmorphone  Injectable 0.25 milliGRAM(s) IV Push every 10 minutes PRN Moderate Pain (4 - 6)  ondansetron Injectable 4 milliGRAM(s) IV Push once PRN Nausea and/or Vomiting      A/P: 67yo POD#0 s/o LSC BSO. Patient is hemodynamically stable and progressing appropriately in the acute post-operative setting.  Neuro: Tylenol, Motrin, and Oxy PRN for pain control  CV: Hemodynamically stable  Pulm: Saturating well on room air. Encourage incentive spirometry  GI: Advance to regular diet  : Voided 100cc, needs to void at least 100cc more by 830p  Heme: HSQ/SCDs/OOB for DVT ppx  ID: No active issues  Endo: No active issues  Dispo: patient stable for discharge home when meeting post-op milestones, including voiding    D/w Dr. Jimmy Wood, PGY-2

## 2023-11-09 NOTE — ASU PATIENT PROFILE, ADULT - FALL HARM RISK - UNIVERSAL INTERVENTIONS
Bed in lowest position, wheels locked, appropriate side rails in place/Call bell, personal items and telephone in reach/Instruct patient to call for assistance before getting out of bed or chair/Non-slip footwear when patient is out of bed/[place] to call system/Physically safe environment - no spills, clutter or unnecessary equipment/Purposeful Proactive Rounding/Room/bathroom lighting operational, light cord in reach

## 2023-11-09 NOTE — BRIEF OPERATIVE NOTE - OPERATION/FINDINGS
EUA: normal external genitalia, normal cervix, ~6-week size anteverted uterus, no adnexal masses.  LSC: atraumatic entry site, grossly normal uterus, bilateral tubes, and ovaries. L ovary with ~2cm simple-appearing cyst. Grossly normal liver edge, stomach, and bowel.

## 2023-11-09 NOTE — BRIEF OPERATIVE NOTE - NSICDXBRIEFPROCEDURE_GEN_ALL_CORE_FT
PROCEDURES:  Pelvic examination under anesthesia 09-Nov-2023 13:14:14  Elisabet Wood  Laparoscopic bilateral salpingo-oophorectomy 09-Nov-2023 13:14:33  Elisabet Wood

## 2023-11-09 NOTE — ASU DISCHARGE PLAN (ADULT/PEDIATRIC) - NO TUB BATHS DURATION
Abdifatah Forman is requesting a refill of LORATADINE PO for a 90 day supply and would like   [facility] - [address]  [address]  [facility] [address]  Phone: [phone] Fax: [phone]  .     Ok to leave a detailed message on voicemail Yes  
Writer to call patient in am to discuss refill request. Filled by outside provider.   
6 weeks

## 2023-11-10 ENCOUNTER — EMERGENCY (EMERGENCY)
Facility: HOSPITAL | Age: 68
LOS: 1 days | Discharge: LEFT BEFORE DISCHARGE | End: 2023-11-10
Attending: STUDENT IN AN ORGANIZED HEALTH CARE EDUCATION/TRAINING PROGRAM
Payer: MEDICARE

## 2023-11-10 ENCOUNTER — NON-APPOINTMENT (OUTPATIENT)
Age: 68
End: 2023-11-10

## 2023-11-10 VITALS
SYSTOLIC BLOOD PRESSURE: 163 MMHG | OXYGEN SATURATION: 95 % | DIASTOLIC BLOOD PRESSURE: 91 MMHG | TEMPERATURE: 98 F | HEIGHT: 68 IN | WEIGHT: 190.04 LBS | HEART RATE: 74 BPM | RESPIRATION RATE: 18 BRPM

## 2023-11-10 VITALS
SYSTOLIC BLOOD PRESSURE: 210 MMHG | DIASTOLIC BLOOD PRESSURE: 115 MMHG | HEART RATE: 92 BPM | RESPIRATION RATE: 19 BRPM | OXYGEN SATURATION: 94 % | TEMPERATURE: 98 F

## 2023-11-10 DIAGNOSIS — Z98.890 OTHER SPECIFIED POSTPROCEDURAL STATES: Chronic | ICD-10-CM

## 2023-11-10 DIAGNOSIS — Z90.49 ACQUIRED ABSENCE OF OTHER SPECIFIED PARTS OF DIGESTIVE TRACT: Chronic | ICD-10-CM

## 2023-11-10 LAB
ALBUMIN SERPL ELPH-MCNC: 4.6 G/DL — SIGNIFICANT CHANGE UP (ref 3.3–5)
ALBUMIN SERPL ELPH-MCNC: 4.6 G/DL — SIGNIFICANT CHANGE UP (ref 3.3–5)
ALP SERPL-CCNC: 39 U/L — LOW (ref 40–120)
ALP SERPL-CCNC: 39 U/L — LOW (ref 40–120)
ALT FLD-CCNC: 100 U/L — HIGH (ref 10–45)
ALT FLD-CCNC: 100 U/L — HIGH (ref 10–45)
ANION GAP SERPL CALC-SCNC: 11 MMOL/L — SIGNIFICANT CHANGE UP (ref 5–17)
ANION GAP SERPL CALC-SCNC: 11 MMOL/L — SIGNIFICANT CHANGE UP (ref 5–17)
ANION GAP SERPL CALC-SCNC: 16 MMOL/L — SIGNIFICANT CHANGE UP (ref 5–17)
ANION GAP SERPL CALC-SCNC: 16 MMOL/L — SIGNIFICANT CHANGE UP (ref 5–17)
APPEARANCE UR: CLEAR — SIGNIFICANT CHANGE UP
APPEARANCE UR: CLEAR — SIGNIFICANT CHANGE UP
APTT BLD: 26.1 SEC — SIGNIFICANT CHANGE UP (ref 24.5–35.6)
APTT BLD: 26.1 SEC — SIGNIFICANT CHANGE UP (ref 24.5–35.6)
AST SERPL-CCNC: 106 U/L — HIGH (ref 10–40)
AST SERPL-CCNC: 106 U/L — HIGH (ref 10–40)
BACTERIA # UR AUTO: NEGATIVE /HPF — SIGNIFICANT CHANGE UP
BACTERIA # UR AUTO: NEGATIVE /HPF — SIGNIFICANT CHANGE UP
BASOPHILS # BLD AUTO: 0.02 K/UL — SIGNIFICANT CHANGE UP (ref 0–0.2)
BASOPHILS # BLD AUTO: 0.02 K/UL — SIGNIFICANT CHANGE UP (ref 0–0.2)
BASOPHILS NFR BLD AUTO: 0.1 % — SIGNIFICANT CHANGE UP (ref 0–2)
BASOPHILS NFR BLD AUTO: 0.1 % — SIGNIFICANT CHANGE UP (ref 0–2)
BILIRUB SERPL-MCNC: 0.8 MG/DL — SIGNIFICANT CHANGE UP (ref 0.2–1.2)
BILIRUB SERPL-MCNC: 0.8 MG/DL — SIGNIFICANT CHANGE UP (ref 0.2–1.2)
BILIRUB UR-MCNC: NEGATIVE — SIGNIFICANT CHANGE UP
BILIRUB UR-MCNC: NEGATIVE — SIGNIFICANT CHANGE UP
BUN SERPL-MCNC: 12 MG/DL — SIGNIFICANT CHANGE UP (ref 7–23)
BUN SERPL-MCNC: 12 MG/DL — SIGNIFICANT CHANGE UP (ref 7–23)
BUN SERPL-MCNC: 15 MG/DL — SIGNIFICANT CHANGE UP (ref 7–23)
BUN SERPL-MCNC: 15 MG/DL — SIGNIFICANT CHANGE UP (ref 7–23)
CALCIUM SERPL-MCNC: 9.1 MG/DL — SIGNIFICANT CHANGE UP (ref 8.4–10.5)
CALCIUM SERPL-MCNC: 9.1 MG/DL — SIGNIFICANT CHANGE UP (ref 8.4–10.5)
CALCIUM SERPL-MCNC: 9.3 MG/DL — SIGNIFICANT CHANGE UP (ref 8.4–10.5)
CALCIUM SERPL-MCNC: 9.3 MG/DL — SIGNIFICANT CHANGE UP (ref 8.4–10.5)
CAST: 1 /LPF — SIGNIFICANT CHANGE UP (ref 0–4)
CAST: 1 /LPF — SIGNIFICANT CHANGE UP (ref 0–4)
CHLORIDE SERPL-SCNC: 82 MMOL/L — LOW (ref 96–108)
CHLORIDE SERPL-SCNC: 82 MMOL/L — LOW (ref 96–108)
CHLORIDE SERPL-SCNC: 84 MMOL/L — LOW (ref 96–108)
CHLORIDE SERPL-SCNC: 84 MMOL/L — LOW (ref 96–108)
CO2 SERPL-SCNC: 23 MMOL/L — SIGNIFICANT CHANGE UP (ref 22–31)
CO2 SERPL-SCNC: 23 MMOL/L — SIGNIFICANT CHANGE UP (ref 22–31)
CO2 SERPL-SCNC: 27 MMOL/L — SIGNIFICANT CHANGE UP (ref 22–31)
CO2 SERPL-SCNC: 27 MMOL/L — SIGNIFICANT CHANGE UP (ref 22–31)
COLOR SPEC: YELLOW — SIGNIFICANT CHANGE UP
COLOR SPEC: YELLOW — SIGNIFICANT CHANGE UP
CREAT ?TM UR-MCNC: 17 MG/DL — SIGNIFICANT CHANGE UP
CREAT ?TM UR-MCNC: 17 MG/DL — SIGNIFICANT CHANGE UP
CREAT SERPL-MCNC: 0.46 MG/DL — LOW (ref 0.5–1.3)
CREAT SERPL-MCNC: 0.46 MG/DL — LOW (ref 0.5–1.3)
CREAT SERPL-MCNC: 0.48 MG/DL — LOW (ref 0.5–1.3)
CREAT SERPL-MCNC: 0.48 MG/DL — LOW (ref 0.5–1.3)
DIFF PNL FLD: ABNORMAL
DIFF PNL FLD: ABNORMAL
EGFR: 102 ML/MIN/1.73M2 — SIGNIFICANT CHANGE UP
EGFR: 102 ML/MIN/1.73M2 — SIGNIFICANT CHANGE UP
EGFR: 104 ML/MIN/1.73M2 — SIGNIFICANT CHANGE UP
EGFR: 104 ML/MIN/1.73M2 — SIGNIFICANT CHANGE UP
EOSINOPHIL # BLD AUTO: 0 K/UL — SIGNIFICANT CHANGE UP (ref 0–0.5)
EOSINOPHIL # BLD AUTO: 0 K/UL — SIGNIFICANT CHANGE UP (ref 0–0.5)
EOSINOPHIL NFR BLD AUTO: 0 % — SIGNIFICANT CHANGE UP (ref 0–6)
EOSINOPHIL NFR BLD AUTO: 0 % — SIGNIFICANT CHANGE UP (ref 0–6)
GAS PNL BLDV: SIGNIFICANT CHANGE UP
GAS PNL BLDV: SIGNIFICANT CHANGE UP
GLUCOSE SERPL-MCNC: 138 MG/DL — HIGH (ref 70–99)
GLUCOSE SERPL-MCNC: 138 MG/DL — HIGH (ref 70–99)
GLUCOSE SERPL-MCNC: 166 MG/DL — HIGH (ref 70–99)
GLUCOSE SERPL-MCNC: 166 MG/DL — HIGH (ref 70–99)
GLUCOSE UR QL: 100 MG/DL
GLUCOSE UR QL: 100 MG/DL
HCT VFR BLD CALC: 35.7 % — SIGNIFICANT CHANGE UP (ref 34.5–45)
HCT VFR BLD CALC: 35.7 % — SIGNIFICANT CHANGE UP (ref 34.5–45)
HGB BLD-MCNC: 12.5 G/DL — SIGNIFICANT CHANGE UP (ref 11.5–15.5)
HGB BLD-MCNC: 12.5 G/DL — SIGNIFICANT CHANGE UP (ref 11.5–15.5)
IMM GRANULOCYTES NFR BLD AUTO: 0.5 % — SIGNIFICANT CHANGE UP (ref 0–0.9)
IMM GRANULOCYTES NFR BLD AUTO: 0.5 % — SIGNIFICANT CHANGE UP (ref 0–0.9)
INR BLD: 1.13 RATIO — SIGNIFICANT CHANGE UP (ref 0.85–1.18)
INR BLD: 1.13 RATIO — SIGNIFICANT CHANGE UP (ref 0.85–1.18)
KETONES UR-MCNC: NEGATIVE MG/DL — SIGNIFICANT CHANGE UP
KETONES UR-MCNC: NEGATIVE MG/DL — SIGNIFICANT CHANGE UP
LEUKOCYTE ESTERASE UR-ACNC: NEGATIVE — SIGNIFICANT CHANGE UP
LEUKOCYTE ESTERASE UR-ACNC: NEGATIVE — SIGNIFICANT CHANGE UP
LIDOCAIN IGE QN: 46 U/L — SIGNIFICANT CHANGE UP (ref 7–60)
LIDOCAIN IGE QN: 46 U/L — SIGNIFICANT CHANGE UP (ref 7–60)
LYMPHOCYTES # BLD AUTO: 1.97 K/UL — SIGNIFICANT CHANGE UP (ref 1–3.3)
LYMPHOCYTES # BLD AUTO: 1.97 K/UL — SIGNIFICANT CHANGE UP (ref 1–3.3)
LYMPHOCYTES # BLD AUTO: 13.6 % — SIGNIFICANT CHANGE UP (ref 13–44)
LYMPHOCYTES # BLD AUTO: 13.6 % — SIGNIFICANT CHANGE UP (ref 13–44)
MCHC RBC-ENTMCNC: 33.3 PG — SIGNIFICANT CHANGE UP (ref 27–34)
MCHC RBC-ENTMCNC: 33.3 PG — SIGNIFICANT CHANGE UP (ref 27–34)
MCHC RBC-ENTMCNC: 35 GM/DL — SIGNIFICANT CHANGE UP (ref 32–36)
MCHC RBC-ENTMCNC: 35 GM/DL — SIGNIFICANT CHANGE UP (ref 32–36)
MCV RBC AUTO: 95.2 FL — SIGNIFICANT CHANGE UP (ref 80–100)
MCV RBC AUTO: 95.2 FL — SIGNIFICANT CHANGE UP (ref 80–100)
MONOCYTES # BLD AUTO: 1.28 K/UL — HIGH (ref 0–0.9)
MONOCYTES # BLD AUTO: 1.28 K/UL — HIGH (ref 0–0.9)
MONOCYTES NFR BLD AUTO: 8.8 % — SIGNIFICANT CHANGE UP (ref 2–14)
MONOCYTES NFR BLD AUTO: 8.8 % — SIGNIFICANT CHANGE UP (ref 2–14)
NEUTROPHILS # BLD AUTO: 11.16 K/UL — HIGH (ref 1.8–7.4)
NEUTROPHILS # BLD AUTO: 11.16 K/UL — HIGH (ref 1.8–7.4)
NEUTROPHILS NFR BLD AUTO: 77 % — SIGNIFICANT CHANGE UP (ref 43–77)
NEUTROPHILS NFR BLD AUTO: 77 % — SIGNIFICANT CHANGE UP (ref 43–77)
NITRITE UR-MCNC: NEGATIVE — SIGNIFICANT CHANGE UP
NITRITE UR-MCNC: NEGATIVE — SIGNIFICANT CHANGE UP
NRBC # BLD: 0 /100 WBCS — SIGNIFICANT CHANGE UP (ref 0–0)
NRBC # BLD: 0 /100 WBCS — SIGNIFICANT CHANGE UP (ref 0–0)
OSMOLALITY UR: 359 MOS/KG — SIGNIFICANT CHANGE UP (ref 300–900)
OSMOLALITY UR: 359 MOS/KG — SIGNIFICANT CHANGE UP (ref 300–900)
PH UR: 8.5 (ref 5–8)
PH UR: 8.5 (ref 5–8)
PLATELET # BLD AUTO: 228 K/UL — SIGNIFICANT CHANGE UP (ref 150–400)
PLATELET # BLD AUTO: 228 K/UL — SIGNIFICANT CHANGE UP (ref 150–400)
POTASSIUM SERPL-MCNC: 3.4 MMOL/L — LOW (ref 3.5–5.3)
POTASSIUM SERPL-MCNC: 3.4 MMOL/L — LOW (ref 3.5–5.3)
POTASSIUM SERPL-MCNC: 3.5 MMOL/L — SIGNIFICANT CHANGE UP (ref 3.5–5.3)
POTASSIUM SERPL-MCNC: 3.5 MMOL/L — SIGNIFICANT CHANGE UP (ref 3.5–5.3)
POTASSIUM SERPL-SCNC: 3.4 MMOL/L — LOW (ref 3.5–5.3)
POTASSIUM SERPL-SCNC: 3.4 MMOL/L — LOW (ref 3.5–5.3)
POTASSIUM SERPL-SCNC: 3.5 MMOL/L — SIGNIFICANT CHANGE UP (ref 3.5–5.3)
POTASSIUM SERPL-SCNC: 3.5 MMOL/L — SIGNIFICANT CHANGE UP (ref 3.5–5.3)
POTASSIUM UR-SCNC: 23 MMOL/L — SIGNIFICANT CHANGE UP
POTASSIUM UR-SCNC: 23 MMOL/L — SIGNIFICANT CHANGE UP
PROT ?TM UR-MCNC: 54 MG/DL — HIGH (ref 0–12)
PROT ?TM UR-MCNC: 54 MG/DL — HIGH (ref 0–12)
PROT SERPL-MCNC: 7.8 G/DL — SIGNIFICANT CHANGE UP (ref 6–8.3)
PROT SERPL-MCNC: 7.8 G/DL — SIGNIFICANT CHANGE UP (ref 6–8.3)
PROT UR-MCNC: 100 MG/DL
PROT UR-MCNC: 100 MG/DL
PROT/CREAT UR-RTO: 3.2 RATIO — HIGH (ref 0–0.2)
PROT/CREAT UR-RTO: 3.2 RATIO — HIGH (ref 0–0.2)
PROTHROM AB SERPL-ACNC: 11.8 SEC — SIGNIFICANT CHANGE UP (ref 9.5–13)
PROTHROM AB SERPL-ACNC: 11.8 SEC — SIGNIFICANT CHANGE UP (ref 9.5–13)
RBC # BLD: 3.75 M/UL — LOW (ref 3.8–5.2)
RBC # BLD: 3.75 M/UL — LOW (ref 3.8–5.2)
RBC # FLD: 13.9 % — SIGNIFICANT CHANGE UP (ref 10.3–14.5)
RBC # FLD: 13.9 % — SIGNIFICANT CHANGE UP (ref 10.3–14.5)
RBC CASTS # UR COMP ASSIST: 6 /HPF — HIGH (ref 0–4)
RBC CASTS # UR COMP ASSIST: 6 /HPF — HIGH (ref 0–4)
REVIEW: SIGNIFICANT CHANGE UP
REVIEW: SIGNIFICANT CHANGE UP
SODIUM SERPL-SCNC: 121 MMOL/L — LOW (ref 135–145)
SODIUM SERPL-SCNC: 121 MMOL/L — LOW (ref 135–145)
SODIUM SERPL-SCNC: 122 MMOL/L — LOW (ref 135–145)
SODIUM SERPL-SCNC: 122 MMOL/L — LOW (ref 135–145)
SODIUM UR-SCNC: 89 MMOL/L — SIGNIFICANT CHANGE UP
SODIUM UR-SCNC: 89 MMOL/L — SIGNIFICANT CHANGE UP
SP GR SPEC: 1.02 — SIGNIFICANT CHANGE UP (ref 1–1.03)
SP GR SPEC: 1.02 — SIGNIFICANT CHANGE UP (ref 1–1.03)
SQUAMOUS # UR AUTO: 1 /HPF — SIGNIFICANT CHANGE UP (ref 0–5)
SQUAMOUS # UR AUTO: 1 /HPF — SIGNIFICANT CHANGE UP (ref 0–5)
UROBILINOGEN FLD QL: 0.2 MG/DL — SIGNIFICANT CHANGE UP (ref 0.2–1)
UROBILINOGEN FLD QL: 0.2 MG/DL — SIGNIFICANT CHANGE UP (ref 0.2–1)
UUN UR-MCNC: 233 MG/DL — SIGNIFICANT CHANGE UP
UUN UR-MCNC: 233 MG/DL — SIGNIFICANT CHANGE UP
WBC # BLD: 14.5 K/UL — HIGH (ref 3.8–10.5)
WBC # BLD: 14.5 K/UL — HIGH (ref 3.8–10.5)
WBC # FLD AUTO: 14.5 K/UL — HIGH (ref 3.8–10.5)
WBC # FLD AUTO: 14.5 K/UL — HIGH (ref 3.8–10.5)
WBC UR QL: 2 /HPF — SIGNIFICANT CHANGE UP (ref 0–5)
WBC UR QL: 2 /HPF — SIGNIFICANT CHANGE UP (ref 0–5)

## 2023-11-10 PROCEDURE — 86901 BLOOD TYPING SEROLOGIC RH(D): CPT

## 2023-11-10 PROCEDURE — 71045 X-RAY EXAM CHEST 1 VIEW: CPT

## 2023-11-10 PROCEDURE — 80053 COMPREHEN METABOLIC PANEL: CPT

## 2023-11-10 PROCEDURE — 85730 THROMBOPLASTIN TIME PARTIAL: CPT

## 2023-11-10 PROCEDURE — 83935 ASSAY OF URINE OSMOLALITY: CPT

## 2023-11-10 PROCEDURE — 81001 URINALYSIS AUTO W/SCOPE: CPT

## 2023-11-10 PROCEDURE — 93005 ELECTROCARDIOGRAM TRACING: CPT

## 2023-11-10 PROCEDURE — 96375 TX/PRO/DX INJ NEW DRUG ADDON: CPT

## 2023-11-10 PROCEDURE — 86850 RBC ANTIBODY SCREEN: CPT

## 2023-11-10 PROCEDURE — 83690 ASSAY OF LIPASE: CPT

## 2023-11-10 PROCEDURE — 84300 ASSAY OF URINE SODIUM: CPT

## 2023-11-10 PROCEDURE — 83930 ASSAY OF BLOOD OSMOLALITY: CPT

## 2023-11-10 PROCEDURE — 99285 EMERGENCY DEPT VISIT HI MDM: CPT | Mod: FS

## 2023-11-10 PROCEDURE — 85014 HEMATOCRIT: CPT

## 2023-11-10 PROCEDURE — 80048 BASIC METABOLIC PNL TOTAL CA: CPT

## 2023-11-10 PROCEDURE — 74177 CT ABD & PELVIS W/CONTRAST: CPT | Mod: 26,MA

## 2023-11-10 PROCEDURE — 99285 EMERGENCY DEPT VISIT HI MDM: CPT | Mod: 25

## 2023-11-10 PROCEDURE — 84156 ASSAY OF PROTEIN URINE: CPT

## 2023-11-10 PROCEDURE — 71045 X-RAY EXAM CHEST 1 VIEW: CPT | Mod: 26

## 2023-11-10 PROCEDURE — 74177 CT ABD & PELVIS W/CONTRAST: CPT | Mod: MA

## 2023-11-10 PROCEDURE — 36415 COLL VENOUS BLD VENIPUNCTURE: CPT

## 2023-11-10 PROCEDURE — 85025 COMPLETE CBC W/AUTO DIFF WBC: CPT

## 2023-11-10 PROCEDURE — 82570 ASSAY OF URINE CREATININE: CPT

## 2023-11-10 PROCEDURE — 84133 ASSAY OF URINE POTASSIUM: CPT

## 2023-11-10 PROCEDURE — 84132 ASSAY OF SERUM POTASSIUM: CPT

## 2023-11-10 PROCEDURE — 86900 BLOOD TYPING SEROLOGIC ABO: CPT

## 2023-11-10 PROCEDURE — 84540 ASSAY OF URINE/UREA-N: CPT

## 2023-11-10 PROCEDURE — 84295 ASSAY OF SERUM SODIUM: CPT

## 2023-11-10 PROCEDURE — 96374 THER/PROPH/DIAG INJ IV PUSH: CPT | Mod: XU

## 2023-11-10 PROCEDURE — 82947 ASSAY GLUCOSE BLOOD QUANT: CPT

## 2023-11-10 PROCEDURE — 85018 HEMOGLOBIN: CPT

## 2023-11-10 PROCEDURE — 82803 BLOOD GASES ANY COMBINATION: CPT

## 2023-11-10 PROCEDURE — 83605 ASSAY OF LACTIC ACID: CPT

## 2023-11-10 PROCEDURE — 82330 ASSAY OF CALCIUM: CPT

## 2023-11-10 PROCEDURE — 82435 ASSAY OF BLOOD CHLORIDE: CPT

## 2023-11-10 PROCEDURE — 85610 PROTHROMBIN TIME: CPT

## 2023-11-10 RX ORDER — POTASSIUM CHLORIDE 20 MEQ
10 PACKET (EA) ORAL ONCE
Refills: 0 | Status: COMPLETED | OUTPATIENT
Start: 2023-11-10 | End: 2023-11-10

## 2023-11-10 RX ORDER — ONDANSETRON 8 MG/1
4 TABLET, FILM COATED ORAL ONCE
Refills: 0 | Status: COMPLETED | OUTPATIENT
Start: 2023-11-10 | End: 2023-11-10

## 2023-11-10 RX ORDER — SODIUM CHLORIDE 9 MG/ML
1000 INJECTION, SOLUTION INTRAVENOUS ONCE
Refills: 0 | Status: COMPLETED | OUTPATIENT
Start: 2023-11-10 | End: 2023-11-10

## 2023-11-10 RX ORDER — FAMOTIDINE 10 MG/ML
20 INJECTION INTRAVENOUS ONCE
Refills: 0 | Status: DISCONTINUED | OUTPATIENT
Start: 2023-11-10 | End: 2023-11-10

## 2023-11-10 RX ORDER — PANTOPRAZOLE SODIUM 20 MG/1
40 TABLET, DELAYED RELEASE ORAL ONCE
Refills: 0 | Status: COMPLETED | OUTPATIENT
Start: 2023-11-10 | End: 2023-11-10

## 2023-11-10 RX ADMIN — PANTOPRAZOLE SODIUM 40 MILLIGRAM(S): 20 TABLET, DELAYED RELEASE ORAL at 09:22

## 2023-11-10 RX ADMIN — SODIUM CHLORIDE 1000 MILLILITER(S): 9 INJECTION, SOLUTION INTRAVENOUS at 09:13

## 2023-11-10 RX ADMIN — ONDANSETRON 4 MILLIGRAM(S): 8 TABLET, FILM COATED ORAL at 09:22

## 2023-11-10 RX ADMIN — Medication 100 MILLIEQUIVALENT(S): at 11:49

## 2023-11-10 NOTE — ED ADULT NURSE REASSESSMENT NOTE - NS ED NURSE REASSESS COMMENT FT1
Report taken from Maria Luisa ELDRIDGE. Patient found with IV in hand removed by herself. Patient given discharge education. VS to order. Patient is visibly anxious and adamant about leaving. MD Munoz made aware of BP and on return to patient pending MD Reassessment she left .

## 2023-11-10 NOTE — ED ADULT NURSE NOTE - PAIN RATING/NUMBER SCALE (0-10): ACTIVITY
Problem: Pain:  Goal: Control of acute pain  Control of acute pain   Outcome: Ongoing  Pt will have control of acute pain until discharge with cough medicine and PRN pain medication. Pt satisfied with pain control at this time. Will continue to monitor. 3 (mild pain)

## 2023-11-10 NOTE — ED PROVIDER NOTE - OBJECTIVE STATEMENT
68 year old female with pmhx HTN, HLD, Depression s/p ECT, GERD s/p laparoscopic bilateral Salpingooophorectomy yesterday presents emerged department complaining of persistent nausea and vomiting which started yesterday around 3 PM.  Patient reports after the procedure she went home and began to have nausea.  She reports that since that approximately 15 episodes of vomiting which is gotten progressively darker.  She also notes that she observed a small amount of blood in one of her incision sites which concerned her.  She has not taken anything for her symptoms thus far.  Denies prior abdominal surgeries.  No fevers chills, chest pain, shortness of breath, diarrhea.

## 2023-11-10 NOTE — ED PROVIDER NOTE - NS ED ATTENDING STATEMENT MOD
This was a shared visit with the HORTENSIA. I reviewed and verified the documentation and independently performed the documented:

## 2023-11-10 NOTE — CONSULT NOTE ADULT - SUBJECTIVE AND OBJECTIVE BOX
JUANA OLSON  68y  Female [id number]    HPI: 67yo POD#1 s/p LSC BSO p/w n/v since last night. Patient states she had episode of projectile vomiting and proceeded to have ~15 episodes of vomiting. This morning had some nausea and came in for evaluation. Denies fevers/chills, n/v, CP, SOB, abdominal pain, vaginal bleeding/discharge, dysuria, changes in bowel habits. Patient currently denies nausea after receiving Zofran/Protonix in the ED ~930a. Was able to eat peaches. Patient ambulating, voiding without issue. Has not yet passed flatus. GYN consulted as patient is POD#1 from [facility] BSO.      Name of GYN Physician: Dr. Marquez    ObHx: NSVDx3, MAB x1  GynHx: h/o ovarian cyst s/p LSC BSO (11/9/23). Denies fibroids, endometriosis, STIs, Abnormal pap smears   PMHx: skin cancer, h/o Afib, HTN, HLD, depression s/p ECT  SurgHx: LSC BSO, cholecystectomy, wrist surgery  Meds: Ezetimibe, Atorvastatin, Losartan-HCTZ, Venlafaxine, Levothroid  Allergies: NKDA  Social History:  Denies smoking use, drug use, alcohol use.    Vital Signs Last 24 Hrs  T(C): 36.9 (10 Nov 2023 15:43), Max: 36.9 (10 Nov 2023 08:39)  T(F): 98.4 (10 Nov 2023 15:43), Max: 98.4 (10 Nov 2023 08:39)  HR: 92 (10 Nov 2023 15:43) (68 - 92)  BP: 210/115 (10 Nov 2023 15:43) (139/88 - 210/115)  BP(mean): 146 (10 Nov 2023 15:43) (108 - 146)  RR: 19 (10 Nov 2023 15:43) (15 - 19)  SpO2: 94% (10 Nov 2023 15:43) (94% - 96%)    Parameters below as of 10 Nov 2023 15:43  Patient On (Oxygen Delivery Method): room air        Physical Exam:   General: sitting comfortably in bed, NAD   HEENT: neck supple, full ROM  CV: clinically well perfused  Lungs: unlabored respirations  Abd: Soft, non-tender, non-distended, no rebound or guarding, LSC umbilical port site C/D/I w/ opsite in place  Ext: non-tender b/l, no edema     LABS:                        12.5   14.50 )-----------( 228      ( 10 Nov 2023 09:34 )             35.7     11-10    122<L>  |  84<L>  |  12  ----------------------------<  138<H>  3.5   |  27  |  0.46<L>    Ca    9.1      10 Nov 2023 13:21    TPro  7.8  /  Alb  4.6  /  TBili  0.8  /  DBili  x   /  AST  106<H>  /  ALT  100<H>  /  AlkPhos  39<L>  11-10      PT/INR - ( 10 Nov 2023 09:34 )   PT: 11.8 sec;   INR: 1.13 ratio         PTT - ( 10 Nov 2023 09:34 )  PTT:26.1 sec  Urinalysis Basic - ( 10 Nov 2023 13:21 )    Color: x / Appearance: x / SG: x / pH: x  Gluc: 138 mg/dL / Ketone: x  / Bili: x / Urobili: x   Blood: x / Protein: x / Nitrite: x   Leuk Esterase: x / RBC: x / WBC x   Sq Epi: x / Non Sq Epi: x / Bacteria: x        RADIOLOGY & ADDITIONAL STUDIES:  < from: CT Abdomen and Pelvis w/ IV Cont (11.10.23 @ 10:50) >  ACC: [id number] EXAM:  CT ABDOMEN AND PELVIS IC   ORDERED BY: EDD HURST     PROCEDURE DATE:  11/10/2023          INTERPRETATION:  CLINICAL INFORMATION: 68-year-old female status post   bilateral salpingo-oophorectomy 11/09/2023, presenting with abdominal   pain, nausea, vomiting.    COMPARISON: CT scan 09/17/2014    CONTRAST/COMPLICATIONS:  IV Contrast: Omnipaque 350  90 cc administered   10 cc discarded  Oral Contrast: NONE  Complications: None reported at time of study completion    PROCEDURE:  CT of the Abdomen and Pelvis was performed.  Sagittal and coronal reformats were performed.    FINDINGS:  LOWER CHEST: Bibasilar atelectasis.    LIVER: Within normal limits.  BILE DUCTS: Normal caliber.  GALLBLADDER: Cholecystectomy.  SPLEEN:Within normal limits.  PANCREAS: Within normal limits.  ADRENALS: Within normal limits.  KIDNEYS/URETERS: Left renal cyst.    BLADDER: Intraluminal air consistent with recent intervention.  REPRODUCTIVE ORGANS: Uterus within normal limits. Minimal vaginal air.    BOWEL: No bowel obstruction. Appendix normal. Scattered colonic   diverticula.  PERITONEUM: No ascites. No intra-abdominal collection  VESSELS: Within normal limits.  RETROPERITONEUM/LYMPH NODES: No lymphadenopathy.  ABDOMINAL WALL: Several droplets of air at umbilicus likely related to   recent laparoscope insertion.  BONES: Levoscoliosis lumbar spine. Degenerative change.    IMPRESSION:  No acute intra-abdominal process        --- End of Report ---            CRYSTAL HUYNH MD; Attending Radiologist  This document has been electronically signed. Nov 10 2023 11:03AM    < end of copied text >

## 2023-11-10 NOTE — ED PROVIDER NOTE - PATIENT PORTAL LINK FT
You can access the FollowMyHealth Patient Portal offered by [facility] by registering at the following website: [email or web address]. By joining [employer]’s FollowMyHealth portal, you will also be able to view your health information using other applications (apps) compatible with our system.

## 2023-11-10 NOTE — ED ADULT NURSE NOTE - OBJECTIVE STATEMENT
Patient c/o N/V, abd pain since yesterday at 3pm. Patient had b/l salpingooophorectomy yesterday and reports n/v following surgery. Since 3pm yesterday patient endorses 15 episodes of dark brown vomit and reports that one episode had some blood. Patient denies diarrhea, chest pain, fever, chills, sob. Reports some drainage from surgical site. Patient A&Ox4, ambulatory. Plan of care in progress.

## 2023-11-10 NOTE — ED PROVIDER NOTE - PROGRESS NOTE DETAILS
pt informed of hyponatremia, she was able to tolerate PO sodium improved from 121--> 122, pt aware that we are recommending admission and nephrology consultation, pt states 'I have had enough" she is counselled on findings regarding hyponat as Risk factor for seizures, aspiration pneumonia, which could lead to hypoxia and death, pt accompanied by susannah who lives w/ patient, she wants to go home and sleep in her bed, pt states she has increased anxiousness she reports that she will follow up with her pcp Dr. Sabi perdomo w/ [facility], ok w/ me reaching out to her PCP to inform her of the abnormal labs, pt reports understanding spoke w/ Dr. Perdomo, recommends admission, pt infromed, pt continues to decline, Patient desires to leave emergency department against medical advice, prior to completion of my desired evaluation and treatment plan.  Patient's mental status is normal, patient is fully alert and oriented x person, place and time.  Patient demonstrates clear reasoning capabilities and capacity to make this decision.  Patient fully understands the explained risks involved with this decision including worsening of medical condition, missed and or delayed diagnosis, permanent disability and death.  All alternative options given to patient and still desires discharge against medical advice from ED and will follow up as an outpatient.  Patient given the option to return to ED at any time to have further evaluation and treatment.

## 2023-11-10 NOTE — ED ADULT NURSE NOTE - NSSEPSISSUSPECTED_ED_A_ED
Neurosurgery Progress Note    Subjective:    On sedation, propofol   Spontaneously moves the left side  Flexes on the right to pain  Disconjugate gaze  PERRL  Decompression full  MR brain- brainstem ok Diffuse  Hemisphere changes  MR c spine ok  Na 148 on 3% at 10mL/hr    Exam:  As above    BP  Min: 121/66  Max: 121/66  Pulse  Av.1  Min: 87  Max: 96  Resp  Av.4  Min: 16  Max: 32  Temp  Av.1 °C (100.6 °F)  Min: 38.1 °C (100.6 °F)  Max: 38.1 °C (100.6 °F)  Monitored Temp 2  Av.1 °C (100.5 °F)  Min: 37.6 °C (99.7 °F)  Max: 38.2 °C (100.8 °F)  SpO2  Av %  Min: 100 %  Max: 100 %    No Data Recorded    Recent Labs      19   0545  19   0600  19   0600   WBC  20.5*  14.9*  13.8*   RBC  2.33*  2.27*  2.53*   HEMOGLOBIN  7.1*  6.8*  7.7*   HEMATOCRIT  23.7*  23.0*  25.2*   MCV  101.7*  101.3*  99.6*   MCH  30.5  30.0  30.4   MCHC  30.0*  29.6*  30.6*   RDW  50.1*  50.2*  51.8*   PLATELETCT  367  400  431   MPV  10.6  10.8  10.8     Recent Labs      19   1640  19   0022  19   0600   SODIUM  150*  150*  148*   POTASSIUM  3.4*  3.3*  3.5*   CHLORIDE  117*  117*  115*   CO2  29  29  28   GLUCOSE  123*  120*  128*   BUN  28*  27*  25*   CREATININE  0.64  0.64  0.61   CALCIUM  8.3*  7.7*  8.4*               Intake/Output       19 - 19 0659 19 - 19 0659       Total  Total       Intake    I.V.  660.3  228 888.3  --  -- --    Fentanyl Volume -- 5 5 -- -- --    Vecuronium  Volume 10 -- 10 -- -- --    Propofol Volume 80.6 23 103.6 -- -- --    Volume (mL) (3% sodium chloride (HYPERTONIC SALINE) 500mL infusion 500 mL) 80 -- 80 -- -- --    Volume (mL) (potassium chloride in water (KCL) ivpb **Administer in ICU only** 40 mEq) 55 -- 55 -- -- --    Volume (mL) (3% sodium chloride (HYPERTONIC SALINE) 500mL infusion 500 mL) 74.7 50 124.7 -- -- --    Volume (mL) (NS infusion) 360 150 510 -- -- --    Volume (mL)  (3% sodium chloride (HYPERTONIC SALINE) 500mL infusion 500 mL) 0 -- 0 -- -- --    Blood  350  -- 350  --  -- --    Volume (RELEASE RED BLOOD CELLS) 350 -- 350 -- -- --    Other  310  -- 310  --  -- --    Medications (PO/Enteral Liquids) 190 -- 190 -- -- --    Flush / Irrigation Volume (Cortrak) 120 -- 120 -- -- --    NG/GT  720  -- 720  --  -- --    Intake (mL) (Enteral Tube 01/27/19 Cortrak - Gastric Left nare) 720 -- 720 -- -- --    IV Piggyback  300  -- 300  --  -- --    Volume (mL) (Linezolid (ZYVOX) premix 600 mg) 300 -- 300 -- -- --    Volume (mL) (cefepime (MAXIPIME) 2 g in  mL IVPB) 0 -- 0 -- -- --    Total Intake 2340.3 228 2568.3 -- -- --       Output    Urine  990  500 1490  --  -- --    Output (mL) (Urethral Catheter 18 Fr.)  -- -- --    Stool  --  -- --  --  -- --    Number of Times Stooled 0 x -- 0 x -- -- --    Total Output  -- -- --       Net I/O     1350.3 -272 1078.3 -- -- --            Intake/Output Summary (Last 24 hours) at 02/08/19 0755  Last data filed at 02/07/19 2300   Gross per 24 hour   Intake           2568.3 ml   Output             1490 ml   Net           1078.3 ml            • 3% sodium chloride  500 mL Continuous   • levETIRAcetam  500 mg BID   • QUEtiapine  50 mg TID   • cefTRIAXone (ROCEPHIN) IV  2 g Q24HRS   • iron sucrose  200 mg DAILY   • famotidine  20 mg BID   • artificial tear ointment  1 Application Q8HRS   • midazolam  2 mg Q HOUR PRN   • fentaNYL   Continuous   • acetaminophen  650 mg Q4HRS PRN   • propranolol  10 mg Q8HRS   • senna-docusate  2 Tab DAILY AT NOON   • polyethylene glycol/lytes  1 Packet DAILY AT NOON   • enoxaparin (LOVENOX) injection  40 mg DAILY   • morphine injection  2 mg Q HOUR PRN    Or   • morphine injection  4 mg Q HOUR PRN   • Pharmacy  1 Each PHARMACY TO DOSE   • cloNIDine  0.1 mg Q4HRS PRN   • magnesium hydroxide  30 mL QDAY PRN   • oxyCODONE immediate-release  2.5 mg Q3HRS PRN   • oxyCODONE immediate-release  5 mg  Q3HRS PRN   • polyethylene glycol/lytes  1 Packet BID PRN   • senna-docusate  1 Tab Q24HRS PRN   • Respiratory Care per Protocol   Continuous RT   • NS   Continuous   • dextrose 50%  25 mL Q15 MIN PRN   • propofol  0-80 mcg/kg/min Continuous   • Pharmacy Consult Request  1 Each PHARMACY TO DOSE   • MD ALERT...DO NOT ADMINISTER NSAIDS or ASPIRIN unless ORDERED By Neurosurgery  1 Each PRN   • ondansetron  4 mg Q4HRS PRN   • hydrALAZINE  10 mg Q HOUR PRN   • bisacodyl  10 mg Q24HRS PRN   • fleet  1 Each Once PRN   • docusate sodium 100mg/10mL  100 mg BID       Assessment and Plan:  POD#13 HD #13  Okay for Lovenox      Increase 3% hypertonic saline to 20mL/hr, Na+ goal is >150  Wean sedation as tolerated  Hold full anticoagulation, cleared for prophylaxis  Repeat CT Monday                       No

## 2023-11-10 NOTE — ED PROVIDER NOTE - PHYSICAL EXAMINATION
CONSTITUTIONAL: Patient is awake, alert and oriented x 3. Patient is uncomfortable appearing and in no acute distress  HEAD: NCAT  NECK: supple, FROM  LUNGS: CTA b/l, no wheezing or rales   HEART: RRR.+S1S2 no murmurs  ABDOMEN: Laparoscopic incision sites with dressings in place, small amount dries blood on umbilical dressing otherwise c/d/i, soft, non-distended, nttp, no rebound or guarding  EXTREMITY: no edema or calf tenderness b/l, FROM upper and lower ext b/l  SKIN: with no rash or lesions  NEURO: No focal deficits

## 2023-11-10 NOTE — ED PROVIDER NOTE - REFUSAL OF SERVICE, MDM
Pt A&O x 3, not intoxicated and capable of making her own decisions. ED recommending admission for hyponatremia work-up and management but patient declining. Risks of leaving were discussed with patient in detail and the case was discussed with patients PCP Dr. Cornejo who also agreed with admission. Patient does not wish to be admitted and is signing out AMA. She is aware that she may return to ED at any time for change in decision or symptoms

## 2023-11-10 NOTE — ED PROVIDER NOTE - ATTENDING APP SHARED VISIT CONTRIBUTION OF CARE
69 y/o female with h/o HTN, HLD, Depression s/p ECT, A-fib single episode s/p ECT (2018), GERD c/o ovarian cysts for about 5 yrs, Lap [place] Salpingooophorectomy on 11/9/23 presents to the Er w/ n/v and abd distension as well as bleeding from the surgical site. pt states since 3 PM yesterday she has had 15 episodes of vomiting now dark brown, pt states that she has sore throat, no neck stiffness, no arm/leg weakness numbness, no dysuria, no vaginal bleeding reports ozzing from surgical wound,   Const: appearing stated age mod distress,   Head: atraumatic, normocephalic  Eyes: no conjunctival injection and no scleral icterus  ENMT: Atraumatic external nose and ears, Dry mucus membranes  Neck: Symmetric, trachea midline, no c spine tenderness  BACK: no bruising, no midline t/l spine tenderness  CVS: +S1/S2, radial pulse 2+ bilaterally  RESP: Unlabored respiratory effort, Clear to auscultation bilaterally  GI: Nontender/Nondistended, soft abdomen  MSK: Extremities w/o deformity or ttp   Neuro: GCS=15, motor in all 4 extremities equal, Sensation grossly intact   Psych: Awake, Alert, & Orientedx3;  Appropriate mood and affect, cooperative  Plan for labs imaging and reassessment, possible intraabdominal pathology possible bowel obstruction vs post surgical fluid collection will need gyn c/s

## 2023-11-10 NOTE — CONSULT NOTE ADULT - ASSESSMENT
A/P: 67yo POD#1 s/p LSC BSO p/w n/v since last night. In the ED, patient reports her sxs have improved and she's tolerating PO. VS wnl and abdominal exam benign. Labs notable for Na 121. CTAP with no evidence of acute intra-abdominal process. Patient is hemodynamically stable.  - No acute GYN intervention  - Anti-emetics PRN  - Patient to follow up with Dr. Marquez for post-operative appointment  - Management of hyponatremia and remainder of management per ED    D/w Dr. Jimmy Wood, PGY-2

## 2023-11-10 NOTE — ED ADULT NURSE NOTE - NSFALLUNIVINTERV_ED_ALL_ED
Bed/Stretcher in lowest position, wheels locked, appropriate side rails in place/Call bell, personal items and telephone in reach/Instruct patient to call for assistance before getting out of bed/chair/stretcher/Non-slip footwear applied when patient is off stretcher/[place] to call system/Physically safe environment - no spills, clutter or unnecessary equipment/Purposeful proactive rounding/Room/bathroom lighting operational, light cord in reach

## 2023-11-10 NOTE — ED PROVIDER NOTE - NSFOLLOWUPINSTRUCTIONS_ED_ALL_ED_FT
You were seen in the emergency department for nausea and vomiting.  You had a CT of your abdomen pelvis which did not reveal any acute concerning findings.  You additionally had lab work performed which showed a low sodium level.  Was recommended that you be admitted to the hospital for further work-up and management of your low sodium level but you have decided to sign out AGAINST MEDICAL ADVICE.    Recommend bland diet until further improvement of nausea/vomiting    Please follow-up with your primary care provider in the next few days for repeat lab work and reevaluation.  Please call office to schedule follow-up appointment.    You may return to the emergency department for any new or worsening symptoms of concern including recurrent nausea and vomiting, fevers, weakness, abdominal pain, dizziness, headaches or any other concerns

## 2023-11-13 LAB
NON-GYNECOLOGICAL CYTOLOGY STUDY: SIGNIFICANT CHANGE UP
NON-GYNECOLOGICAL CYTOLOGY STUDY: SIGNIFICANT CHANGE UP

## 2023-11-20 LAB
SURGICAL PATHOLOGY STUDY: SIGNIFICANT CHANGE UP
SURGICAL PATHOLOGY STUDY: SIGNIFICANT CHANGE UP

## 2023-11-28 ENCOUNTER — APPOINTMENT (OUTPATIENT)
Dept: OBGYN | Facility: CLINIC | Age: 68
End: 2023-11-28
Payer: MEDICARE

## 2023-11-28 VITALS
WEIGHT: 190 LBS | HEART RATE: 87 BPM | HEIGHT: 69 IN | SYSTOLIC BLOOD PRESSURE: 133 MMHG | BODY MASS INDEX: 28.14 KG/M2 | DIASTOLIC BLOOD PRESSURE: 80 MMHG

## 2023-11-28 PROCEDURE — 99024 POSTOP FOLLOW-UP VISIT: CPT

## 2023-12-12 ENCOUNTER — APPOINTMENT (OUTPATIENT)
Dept: OPHTHALMOLOGY | Facility: CLINIC | Age: 68
End: 2023-12-12
Payer: MEDICARE

## 2023-12-12 ENCOUNTER — NON-APPOINTMENT (OUTPATIENT)
Age: 68
End: 2023-12-12

## 2023-12-12 PROCEDURE — 92014 COMPRE OPH EXAM EST PT 1/>: CPT

## 2023-12-12 PROCEDURE — 76512 OPH US DX B-SCAN: CPT | Mod: LT

## 2024-06-11 ENCOUNTER — NON-APPOINTMENT (OUTPATIENT)
Age: 69
End: 2024-06-11

## 2024-06-11 ENCOUNTER — APPOINTMENT (OUTPATIENT)
Dept: OPHTHALMOLOGY | Facility: CLINIC | Age: 69
End: 2024-06-11
Payer: MEDICARE

## 2024-06-11 PROCEDURE — 92012 INTRM OPH EXAM EST PATIENT: CPT

## 2024-12-21 ENCOUNTER — NON-APPOINTMENT (OUTPATIENT)
Age: 69
End: 2024-12-21

## 2024-12-24 ENCOUNTER — NON-APPOINTMENT (OUTPATIENT)
Age: 69
End: 2024-12-24

## 2024-12-24 ENCOUNTER — APPOINTMENT (OUTPATIENT)
Dept: OPHTHALMOLOGY | Facility: CLINIC | Age: 69
End: 2024-12-24
Payer: MEDICARE

## 2024-12-24 PROCEDURE — 92014 COMPRE OPH EXAM EST PT 1/>: CPT

## 2024-12-24 PROCEDURE — 92250 FUNDUS PHOTOGRAPHY W/I&R: CPT

## 2025-03-03 ENCOUNTER — NON-APPOINTMENT (OUTPATIENT)
Age: 70
End: 2025-03-03

## 2025-09-08 ENCOUNTER — NON-APPOINTMENT (OUTPATIENT)
Age: 70
End: 2025-09-08

## (undated) DEVICE — LIGASURE BLUNT TIP 37CM

## (undated) DEVICE — UTERINE MANIPULATOR CLINICAL INNOVATIONS CLEARVIEW 7CM

## (undated) DEVICE — GLV 7 PROTEXIS (BLUE)

## (undated) DEVICE — TUBING STRYKEFLOW II SUCTION / IRRIGATOR

## (undated) DEVICE — GLV 6 PROTEXIS (WHITE)

## (undated) DEVICE — D HELP - CLEARVIEW CLEARIFY SYSTEM

## (undated) DEVICE — GLV 6.5 PROTEXIS (WHITE)

## (undated) DEVICE — BLADE SCALPEL SAFETYLOCK #15

## (undated) DEVICE — WARMING BLANKET UPPER ADULT

## (undated) DEVICE — VENODYNE/SCD SLEEVE CALF MEDIUM

## (undated) DEVICE — FOLEY TRAY 16FR 5CC LF BAG CLOSED

## (undated) DEVICE — POSITIONER PINK PAD PIGAZZI SYSTEM

## (undated) DEVICE — DRAPE TOWEL BLUE STICKY

## (undated) DEVICE — DRSG TEGADERM 2.5X3"

## (undated) DEVICE — POSITIONER PURPLE ARM ONE STEP (LARGE)

## (undated) DEVICE — TROCAR GELPOINT MINI ADVANCED

## (undated) DEVICE — INSUFFLATION NDL COVIDIEN STEP 14G FOR STEP/VERSASTEP

## (undated) DEVICE — SUT POLYSORB 2-0 30" V-20 UNDYED

## (undated) DEVICE — TUBING IRRIGATION DAVOL SYSTEM X STREAM

## (undated) DEVICE — DRAPE MAYO STAND 30"

## (undated) DEVICE — APPLICATOR SURGICEL LAP TROCAR POINT 2.5MM X 150MM

## (undated) DEVICE — SOL INJ NS 0.9% 500ML 2 PORT

## (undated) DEVICE — TROCAR COVIDIEN VERSAONE BLADED FIXATION 11MM STANDARD

## (undated) DEVICE — TROCAR COVIDIEN VERSASTEP 5MM STANDARD

## (undated) DEVICE — GLV 7 PROTEXIS (WHITE)

## (undated) DEVICE — TROCAR COVIDIEN BLUNT TIP HASSAN 10MM

## (undated) DEVICE — ENDOCATCH 10MM SPECIMEN POUCH

## (undated) DEVICE — SYR LUER LOK 50CC

## (undated) DEVICE — DRSG OPSITE 2.5 X 2"

## (undated) DEVICE — SUT POLYSORB 0 30" GS-23

## (undated) DEVICE — SOL IRR POUR NS 0.9% 1000ML

## (undated) DEVICE — PACK GYN LAPAROSCOPY

## (undated) DEVICE — SUT MONOCRYL 4-0 27" PS-2 UNDYED

## (undated) DEVICE — VALVE YELLOW PORT SEAL PLUS 5MM

## (undated) DEVICE — DRSG MASTISOL

## (undated) DEVICE — PREP CHLORAPREP HI-LITE ORANGE 26ML